# Patient Record
Sex: MALE | Race: WHITE | NOT HISPANIC OR LATINO | Employment: OTHER
[De-identification: names, ages, dates, MRNs, and addresses within clinical notes are randomized per-mention and may not be internally consistent; named-entity substitution may affect disease eponyms.]

---

## 2017-01-04 ENCOUNTER — RECORDS - HEALTHEAST (OUTPATIENT)
Dept: ADMINISTRATIVE | Facility: OTHER | Age: 77
End: 2017-01-04

## 2017-02-06 ENCOUNTER — AMBULATORY - HEALTHEAST (OUTPATIENT)
Dept: CARDIOLOGY | Facility: CLINIC | Age: 77
End: 2017-02-06

## 2017-02-06 ENCOUNTER — OFFICE VISIT - HEALTHEAST (OUTPATIENT)
Dept: CARDIOLOGY | Facility: CLINIC | Age: 77
End: 2017-02-06

## 2017-02-06 DIAGNOSIS — I35.0 AORTIC STENOSIS, MILD: ICD-10-CM

## 2017-02-06 DIAGNOSIS — R00.2 PALPITATIONS: ICD-10-CM

## 2017-02-06 ASSESSMENT — MIFFLIN-ST. JEOR: SCORE: 1589.43

## 2017-02-17 ENCOUNTER — HOSPITAL ENCOUNTER (OUTPATIENT)
Dept: CARDIOLOGY | Facility: HOSPITAL | Age: 77
Discharge: HOME | End: 2017-02-17
Attending: INTERNAL MEDICINE

## 2017-02-17 DIAGNOSIS — R00.2 PALPITATIONS: ICD-10-CM

## 2017-02-17 DIAGNOSIS — I35.0 AORTIC STENOSIS, MILD: ICD-10-CM

## 2017-02-17 ASSESSMENT — MIFFLIN-ST. JEOR
SCORE: 1589.43
SCORE: 1589.43

## 2017-02-20 LAB
AORTIC ROOT: 4.4 CM
AORTIC VALVE MEAN VELOCITY: 240 CM/S
AR DECEL SLOPE: 960 MM/S2
AR PEAK VELOCITY: 299 CM/S
ASCENDING AORTA: 3.9 CM
AV DIMENSIONLESS INDEX VTI: 0.3
AV MEAN GRADIENT: 26 MMHG
AV PEAK GRADIENT: 41.2 MMHG
AV REGURGITANT PEAK GRADIENT: 35.8 MMHG
AV REGURGITATION PRESSURE HALF TIME: 912 MS
AV VALVE AREA: 1.1 CM2
AV VELOCITY RATIO: 0.3
BSA FOR ECHO PROCEDURE: 2.08 M2
CV BLOOD PRESSURE: NORMAL MMHG
CV ECHO HEIGHT: 69 IN
CV ECHO WEIGHT: 196 LBS
DOP CALC AO PEAK VEL: 321 CM/S
DOP CALC AO VTI: 75.2 CM
DOP CALC LVOT AREA: 3.8 CM2
DOP CALC LVOT DIAMETER: 2.2 CM
DOP CALC LVOT PEAK VEL: 88 CM/S
DOP CALC LVOT STROKE VOLUME: 85.5 CM3
DOP CALCLVOT PEAK VEL VTI: 22.5 CM
EJECTION FRACTION: 56 % (ref 55–75)
FRACTIONAL SHORTENING: 28.9 % (ref 28–44)
INTERVENTRICULAR SEPTUM IN END DIASTOLE: 1.3 CM (ref 0.6–1)
IVS/PW RATIO: 1.2
LA AREA 1: 18.2 CM2
LA AREA 2: 19.7 CM2
LEFT ATRIUM LENGTH: 4.75 CM
LEFT ATRIUM SIZE: 4.3 CM
LEFT ATRIUM TO AORTIC ROOT RATIO: 0.98 NO UNITS
LEFT ATRIUM VOLUME INDEX: 30.8 ML/M2
LEFT ATRIUM VOLUME: 64.2 CM3
LEFT VENTRICLE CARDIAC INDEX: 3.6 L/MIN/M2
LEFT VENTRICLE CARDIAC OUTPUT: 7.4 L/MIN
LEFT VENTRICLE DIASTOLIC VOLUME INDEX: 78.4 CM3/M2 (ref 34–74)
LEFT VENTRICLE DIASTOLIC VOLUME: 163 CM3 (ref 62–150)
LEFT VENTRICLE HEART RATE: 87 BPM
LEFT VENTRICLE MASS INDEX: 95.2 G/M2
LEFT VENTRICLE SYSTOLIC VOLUME INDEX: 34.1 CM3/M2 (ref 11–31)
LEFT VENTRICLE SYSTOLIC VOLUME: 71 CM3 (ref 21–61)
LEFT VENTRICULAR INTERNAL DIMENSION IN DIASTOLE: 4.5 CM (ref 4.2–5.8)
LEFT VENTRICULAR INTERNAL DIMENSION IN SYSTOLE: 3.2 CM (ref 2.5–4)
LEFT VENTRICULAR MASS: 198.1 G
LEFT VENTRICULAR OUTFLOW TRACT MEAN GRADIENT: 1 MMHG
LEFT VENTRICULAR OUTFLOW TRACT MEAN VELOCITY: 56.1 CM/S
LEFT VENTRICULAR OUTFLOW TRACT PEAK GRADIENT: 3 MMHG
LEFT VENTRICULAR POSTERIOR WALL IN END DIASTOLE: 1.1 CM (ref 0.6–1)
LV STROKE VOLUME INDEX: 41.1 ML/M2
MITRAL VALVE E/A RATIO: 0.6
MV AVERAGE E/E' RATIO: 9.4 CM/S
MV DECELERATION TIME: 246 MS
MV E'TISSUE VEL-LAT: 6.53 CM/S
MV E'TISSUE VEL-MED: 6.19 CM/S
MV LATERAL E/E' RATIO: 9.1
MV MEDIAL E/E' RATIO: 9.6
MV PEAK A VELOCITY: 96.7 CM/S
MV PEAK E VELOCITY: 59.7 CM/S
NUC REST DIASTOLIC VOLUME INDEX: 3136 LBS
NUC REST SYSTOLIC VOLUME INDEX: 69 IN
RIGHT VENTRICULAR INTERNAL DIMENSION IN DYSTOLE: 3.5 CM
TRICUSPID REGURGITATION PEAK PRESSURE GRADIENT: 25.6 MMHG
TRICUSPID VALVE PEAK REGURGITANT VELOCITY: 253 CM/S

## 2017-02-23 ENCOUNTER — AMBULATORY - HEALTHEAST (OUTPATIENT)
Dept: CARDIOLOGY | Facility: CLINIC | Age: 77
End: 2017-02-23

## 2017-02-23 ENCOUNTER — RECORDS - HEALTHEAST (OUTPATIENT)
Dept: ADMINISTRATIVE | Facility: OTHER | Age: 77
End: 2017-02-23

## 2017-02-23 ENCOUNTER — COMMUNICATION - HEALTHEAST (OUTPATIENT)
Dept: CARDIOLOGY | Facility: CLINIC | Age: 77
End: 2017-02-23

## 2017-02-23 ENCOUNTER — COMMUNICATION - HEALTHEAST (OUTPATIENT)
Dept: PULMONOLOGY | Facility: OTHER | Age: 77
End: 2017-02-23

## 2017-03-03 ENCOUNTER — OFFICE VISIT - HEALTHEAST (OUTPATIENT)
Dept: PULMONOLOGY | Facility: OTHER | Age: 77
End: 2017-03-03

## 2017-03-03 DIAGNOSIS — R06.09 DYSPNEA ON EXERTION: ICD-10-CM

## 2017-03-03 ASSESSMENT — MIFFLIN-ST. JEOR: SCORE: 1605.76

## 2017-03-06 ENCOUNTER — AMBULATORY - HEALTHEAST (OUTPATIENT)
Dept: CARDIOLOGY | Facility: CLINIC | Age: 77
End: 2017-03-06

## 2017-03-06 DIAGNOSIS — R06.09 DOE (DYSPNEA ON EXERTION): ICD-10-CM

## 2017-03-14 ENCOUNTER — HOSPITAL ENCOUNTER (OUTPATIENT)
Dept: CARDIOLOGY | Facility: HOSPITAL | Age: 77
Discharge: HOME | End: 2017-03-14
Attending: INTERNAL MEDICINE

## 2017-03-14 ENCOUNTER — AMBULATORY - HEALTHEAST (OUTPATIENT)
Dept: LAB | Facility: HOSPITAL | Age: 77
End: 2017-03-14

## 2017-03-14 ENCOUNTER — HOSPITAL ENCOUNTER (OUTPATIENT)
Dept: NUCLEAR MEDICINE | Facility: HOSPITAL | Age: 77
Discharge: HOME | End: 2017-03-14
Attending: INTERNAL MEDICINE

## 2017-03-14 DIAGNOSIS — R06.09 OTHER FORMS OF DYSPNEA: ICD-10-CM

## 2017-03-14 DIAGNOSIS — E78.2 MIXED HYPERLIPIDEMIA: ICD-10-CM

## 2017-03-14 DIAGNOSIS — R06.09 DOE (DYSPNEA ON EXERTION): ICD-10-CM

## 2017-03-14 DIAGNOSIS — E11.9 DIABETES MELLITUS (H): ICD-10-CM

## 2017-03-14 LAB
CHOLEST SERPL-MCNC: 172 MG/DL
CV STRESS MAX HR HE: 107
FASTING STATUS PATIENT QL REPORTED: YES
HBA1C MFR BLD: 7.1 % (ref 4.2–6.1)
HDLC SERPL-MCNC: 38 MG/DL
LDLC SERPL CALC-MCNC: 108 MG/DL
NUC STRESS EJECTION FRACTION: 33 %
STRESS ECHO BASELINE BP: NORMAL M/S
STRESS ECHO BASELINE HR: 86 BPM
STRESS ECHO CALCULATED PERCENT HR: 74 %
STRESS ECHO LAST STRESS BP: NORMAL M/S
TRIGL SERPL-MCNC: 131 MG/DL

## 2017-03-17 DIAGNOSIS — Z86.73 HISTORY OF TIA (TRANSIENT ISCHEMIC ATTACK) AND STROKE: ICD-10-CM

## 2017-03-17 NOTE — TELEPHONE ENCOUNTER
simvastatin (ZOCOR) 20 MG tablet     Last Written Prescription Date: 2015  Last Fill Quantity: 90, # refills: 3  Last Office Visit with [facility] or [facility] prescribing provider: 2015       Lab Results   Component Value Date    CHOL 136 01/17/2012     Lab Results   Component Value Date    HDL 36 01/17/2012     Lab Results   Component Value Date    LDL 79 01/17/2012     Lab Results   Component Value Date    TRIG 102 01/17/2012     Lab Results   Component Value Date    CHOLHDLRATIO 3.7 01/17/2012     Please call patient, find out who is managing his medication and care.     Melody Ramirez, SWETAN, RN, PHN  Care Coordinator  [facility]

## 2017-03-21 ENCOUNTER — AMBULATORY - HEALTHEAST (OUTPATIENT)
Dept: CARDIOLOGY | Facility: CLINIC | Age: 77
End: 2017-03-21

## 2017-03-21 DIAGNOSIS — R06.09 DOE (DYSPNEA ON EXERTION): ICD-10-CM

## 2017-03-22 RX ORDER — SIMVASTATIN 20 MG
20 TABLET ORAL AT BEDTIME
Qty: 90 TABLET | Refills: 3 | OUTPATIENT
Start: 2017-03-22

## 2017-03-22 NOTE — TELEPHONE ENCOUNTER
Called and spoke to patient. He states he can send the refill to his primary physician Ronald Saucedo. Notified pharmacy.

## 2017-03-24 ENCOUNTER — AMBULATORY - HEALTHEAST (OUTPATIENT)
Dept: CARDIOLOGY | Facility: CLINIC | Age: 77
End: 2017-03-24

## 2017-03-24 DIAGNOSIS — R06.09 DOE (DYSPNEA ON EXERTION): ICD-10-CM

## 2017-03-28 ENCOUNTER — AMBULATORY - HEALTHEAST (OUTPATIENT)
Dept: CARDIOLOGY | Facility: CLINIC | Age: 77
End: 2017-03-28

## 2017-03-29 ENCOUNTER — AMBULATORY - HEALTHEAST (OUTPATIENT)
Dept: CARDIOLOGY | Facility: CLINIC | Age: 77
End: 2017-03-29

## 2017-03-29 ENCOUNTER — OFFICE VISIT - HEALTHEAST (OUTPATIENT)
Dept: CARDIOLOGY | Facility: CLINIC | Age: 77
End: 2017-03-29

## 2017-03-29 DIAGNOSIS — R06.09 DOE (DYSPNEA ON EXERTION): ICD-10-CM

## 2017-03-29 DIAGNOSIS — I49.1 PAC (PREMATURE ATRIAL CONTRACTION): ICD-10-CM

## 2017-03-29 DIAGNOSIS — I42.9 CARDIOMYOPATHY (H): ICD-10-CM

## 2017-03-29 DIAGNOSIS — I35.0 MODERATE AORTIC STENOSIS: ICD-10-CM

## 2017-03-29 DIAGNOSIS — R94.39 ABNORMAL NUCLEAR STRESS TEST: ICD-10-CM

## 2017-03-29 DIAGNOSIS — R53.83 FATIGUE: ICD-10-CM

## 2017-03-29 ASSESSMENT — MIFFLIN-ST. JEOR: SCORE: 1605.76

## 2017-03-30 ENCOUNTER — COMMUNICATION - HEALTHEAST (OUTPATIENT)
Dept: CARDIOLOGY | Facility: CLINIC | Age: 77
End: 2017-03-30

## 2017-03-30 ENCOUNTER — HOSPITAL ENCOUNTER (OUTPATIENT)
Dept: CARDIOLOGY | Facility: HOSPITAL | Age: 77
Discharge: HOME | End: 2017-03-30
Attending: INTERNAL MEDICINE

## 2017-03-30 DIAGNOSIS — I42.9 CARDIOMYOPATHY (H): ICD-10-CM

## 2017-03-31 LAB
ALBUMIN PERCENT: 62.9 % (ref 51–67)
ALBUMIN SERPL ELPH-MCNC: 3.9 G/DL (ref 3.2–4.7)
ALPHA 1 PERCENT: 2.6 % (ref 2–4)
ALPHA 2 PERCENT: 11.9 % (ref 5–13)
ALPHA1 GLOB SERPL ELPH-MCNC: 0.2 G/DL (ref 0.1–0.3)
ALPHA2 GLOB SERPL ELPH-MCNC: 0.7 G/DL (ref 0.4–0.9)
B-GLOBULIN SERPL ELPH-MCNC: 0.7 G/DL (ref 0.7–1.2)
BETA PERCENT: 11.6 % (ref 10–17)
GAMMA GLOB SERPL ELPH-MCNC: 0.7 G/DL (ref 0.6–1.4)
GAMMA GLOBULIN PERCENT: 11 % (ref 9–20)
PATH ICD:: NORMAL
PROT PATTERN SERPL ELPH-IMP: NORMAL
PROT SERPL-MCNC: 6.2 G/DL (ref 6–8)
REVIEWING PATHOLOGIST: NORMAL

## 2017-04-03 ENCOUNTER — COMMUNICATION - HEALTHEAST (OUTPATIENT)
Dept: CARDIOLOGY | Facility: CLINIC | Age: 77
End: 2017-04-03

## 2017-04-04 ENCOUNTER — COMMUNICATION - HEALTHEAST (OUTPATIENT)
Dept: CARDIOLOGY | Facility: CLINIC | Age: 77
End: 2017-04-04

## 2017-04-06 ENCOUNTER — HOSPITAL ENCOUNTER (OUTPATIENT)
Dept: CT IMAGING | Facility: CLINIC | Age: 77
Discharge: HOME | End: 2017-04-06
Attending: INTERNAL MEDICINE

## 2017-04-06 ENCOUNTER — COMMUNICATION - HEALTHEAST (OUTPATIENT)
Dept: CARDIOLOGY | Facility: CLINIC | Age: 77
End: 2017-04-06

## 2017-04-06 DIAGNOSIS — R06.09 DOE (DYSPNEA ON EXERTION): ICD-10-CM

## 2017-04-06 DIAGNOSIS — R06.09 OTHER FORMS OF DYSPNEA: ICD-10-CM

## 2017-04-06 DIAGNOSIS — I42.9 CARDIOMYOPATHY (H): ICD-10-CM

## 2017-04-06 DIAGNOSIS — R94.39 ABNORMAL NUCLEAR STRESS TEST: ICD-10-CM

## 2017-04-06 LAB
CV CALCIUM SCORE AGATSTON LM: 0
CV CALCIUM SCORING AGATSON LAD: 79
CV CALCIUM SCORING AGATSTON CX: 152
CV CALCIUM SCORING AGATSTON RCA: 29
CV CALCIUM SCORING AGATSTON TOTAL: 260

## 2017-04-06 ASSESSMENT — MIFFLIN-ST. JEOR: SCORE: 1557.68

## 2017-04-14 ENCOUNTER — AMBULATORY - HEALTHEAST (OUTPATIENT)
Dept: CARDIOLOGY | Facility: CLINIC | Age: 77
End: 2017-04-14

## 2017-04-17 ENCOUNTER — OFFICE VISIT - HEALTHEAST (OUTPATIENT)
Dept: CARDIOLOGY | Facility: CLINIC | Age: 77
End: 2017-04-17

## 2017-04-17 DIAGNOSIS — I35.0 MODERATE AORTIC STENOSIS: ICD-10-CM

## 2017-04-17 DIAGNOSIS — I10 ESSENTIAL HYPERTENSION: ICD-10-CM

## 2017-04-17 DIAGNOSIS — I49.1 ATRIAL PREMATURE BEATS: ICD-10-CM

## 2017-04-17 ASSESSMENT — MIFFLIN-ST. JEOR: SCORE: 1580.36

## 2017-06-28 ENCOUNTER — RECORDS - HEALTHEAST (OUTPATIENT)
Dept: ADMINISTRATIVE | Facility: OTHER | Age: 77
End: 2017-06-28

## 2017-09-01 ENCOUNTER — OFFICE VISIT - HEALTHEAST (OUTPATIENT)
Dept: CARDIOLOGY | Facility: CLINIC | Age: 77
End: 2017-09-01

## 2017-09-01 DIAGNOSIS — E78.5 DYSLIPIDEMIA, GOAL LDL BELOW 100: ICD-10-CM

## 2017-09-01 DIAGNOSIS — I35.0 MODERATE AORTIC STENOSIS: ICD-10-CM

## 2017-09-01 ASSESSMENT — MIFFLIN-ST. JEOR: SCORE: 1580.36

## 2017-09-29 ENCOUNTER — COMMUNICATION - HEALTHEAST (OUTPATIENT)
Dept: CARDIOLOGY | Facility: CLINIC | Age: 77
End: 2017-09-29

## 2018-01-11 ENCOUNTER — RECORDS - HEALTHEAST (OUTPATIENT)
Dept: ADMINISTRATIVE | Facility: OTHER | Age: 78
End: 2018-01-11

## 2018-01-18 ENCOUNTER — HOSPITAL ENCOUNTER (OUTPATIENT)
Dept: RADIOLOGY | Facility: HOSPITAL | Age: 78
Discharge: HOME | End: 2018-01-18
Attending: OTOLARYNGOLOGY

## 2018-01-18 DIAGNOSIS — R05.9 COUGH: ICD-10-CM

## 2018-02-01 ENCOUNTER — COMMUNICATION - HEALTHEAST (OUTPATIENT)
Dept: ADMINISTRATIVE | Facility: CLINIC | Age: 78
End: 2018-02-01

## 2018-02-05 ENCOUNTER — AMBULATORY - HEALTHEAST (OUTPATIENT)
Dept: CARDIOLOGY | Facility: CLINIC | Age: 78
End: 2018-02-05

## 2018-02-05 DIAGNOSIS — I35.0 AORTIC STENOSIS: ICD-10-CM

## 2018-02-05 DIAGNOSIS — R06.02 SHORTNESS OF BREATH: ICD-10-CM

## 2018-02-14 ENCOUNTER — AMBULATORY - HEALTHEAST (OUTPATIENT)
Dept: LAB | Facility: HOSPITAL | Age: 78
End: 2018-02-14

## 2018-02-14 ENCOUNTER — HOSPITAL ENCOUNTER (OUTPATIENT)
Dept: CARDIOLOGY | Facility: HOSPITAL | Age: 78
Discharge: HOME | End: 2018-02-14
Attending: INTERNAL MEDICINE

## 2018-02-14 DIAGNOSIS — R06.02 SHORTNESS OF BREATH: ICD-10-CM

## 2018-02-14 DIAGNOSIS — I35.0 AORTIC STENOSIS: ICD-10-CM

## 2018-02-14 DIAGNOSIS — I35.0 MODERATE AORTIC STENOSIS: ICD-10-CM

## 2018-02-14 LAB
AORTIC ROOT: 4.2 CM
AORTIC VALVE MEAN VELOCITY: 250 CM/S
AR DECEL SLOPE: 2710 MM/S2
AR PEAK VELOCITY: 423 CM/S
ASCENDING AORTA: 3.7 CM
AV CUSP SEPERATION: 0.8 CM
AV CUSP SEPERATION: 1.1 CM
AV CUSP SEPERATION: 1.4 CM
AV DIMENSIONLESS INDEX VTI: 0.2
AV MEAN GRADIENT: 26 MMHG
AV PEAK GRADIENT: 37.5 MMHG
AV REGURGITANT PEAK GRADIENT: 71.6 MMHG
AV REGURGITATION PRESSURE HALF TIME: 459 MS
AV VALVE AREA: 1.2 CM2
AV VELOCITY RATIO: 0.3
BNP SERPL-MCNC: 105 PG/ML (ref 0–80)
BSA FOR ECHO PROCEDURE: 2.07 M2
CV ECHO HEIGHT: 69 IN
CV ECHO WEIGHT: 194 LBS
DOP CALC AO PEAK VEL: 306 CM/S
DOP CALC AO VTI: 71.7 CM
DOP CALC LVOT AREA: 4.91 CM2
DOP CALC LVOT DIAMETER: 2.5 CM
DOP CALC LVOT PEAK VEL: 82.8 CM/S
DOP CALC LVOT STROKE VOLUME: 85.9 CM3
DOP CALC MV VTI: 30.8 CM
DOP CALCLVOT PEAK VEL VTI: 17.5 CM
ECHO EJECTION FRACTION ESTIMATED: 40 %
EJECTION FRACTION: 60 % (ref 55–75)
FRACTIONAL SHORTENING: 25.6 % (ref 28–44)
INTERVENTRICULAR SEPTUM IN END DIASTOLE: 1.21 CM (ref 0.6–1)
IVS/PW RATIO: 1.1
LA AREA 1: 26 CM2
LA AREA 2: 27.8 CM2
LEFT ATRIUM LENGTH: 6.1 CM
LEFT ATRIUM SIZE: 4.3 CM
LEFT ATRIUM TO AORTIC ROOT RATIO: 0.91 NO UNITS
LEFT ATRIUM VOLUME INDEX: 48.7 ML/M2
LEFT ATRIUM VOLUME: 100.7 ML
LEFT VENTRICLE CARDIAC INDEX: 3.3 L/MIN/M2
LEFT VENTRICLE CARDIAC OUTPUT: 6.9 L/MIN
LEFT VENTRICLE DIASTOLIC VOLUME INDEX: 49.8 CM3/M2 (ref 34–74)
LEFT VENTRICLE DIASTOLIC VOLUME: 103 CM3 (ref 62–150)
LEFT VENTRICLE HEART RATE: 80 BPM
LEFT VENTRICLE MASS INDEX: 96.8 G/M2
LEFT VENTRICLE SYSTOLIC VOLUME INDEX: 20 CM3/M2 (ref 11–31)
LEFT VENTRICLE SYSTOLIC VOLUME: 41.4 CM3 (ref 21–61)
LEFT VENTRICULAR INTERNAL DIMENSION IN DIASTOLE: 4.64 CM (ref 4.2–5.8)
LEFT VENTRICULAR INTERNAL DIMENSION IN SYSTOLE: 3.45 CM (ref 2.5–4)
LEFT VENTRICULAR MASS: 200.4 G
LEFT VENTRICULAR OUTFLOW TRACT MEAN GRADIENT: 2 MMHG
LEFT VENTRICULAR OUTFLOW TRACT MEAN VELOCITY: 64 CM/S
LEFT VENTRICULAR OUTFLOW TRACT PEAK GRADIENT: 3 MMHG
LEFT VENTRICULAR POSTERIOR WALL IN END DIASTOLE: 1.13 CM (ref 0.6–1)
LV STROKE VOLUME INDEX: 41.5 ML/M2
MITRAL VALVE DECELERATION SLOPE: 4400 MM/S2
MITRAL VALVE E/A RATIO: 0.9
MITRAL VALVE MEAN INFLOW VELOCITY: 61.3 CM/S
MITRAL VALVE PEAK VELOCITY: 103 CM/S
MITRAL VALVE PRESSURE HALF-TIME: 60 MS
MV AREA VTI: 2.79 CM2
MV DECELERATION TIME: 148 MS
MV MEAN GRADIENT: 2 MMHG
MV PEAK A VELOCITY: 92.6 CM/S
MV PEAK E VELOCITY: 82 CM/S
MV PEAK GRADIENT: 4.2 MMHG
MV VALVE AREA BY CONTINUITY EQUATION: 2.8 CM2
MV VALVE AREA PRESSURE 1/2 METHOD: 3.7 CM2
NUC REST DIASTOLIC VOLUME INDEX: 3104 LBS
NUC REST SYSTOLIC VOLUME INDEX: 69 IN
TRICUSPID REGURGITATION PEAK PRESSURE GRADIENT: 18.7 MMHG
TRICUSPID VALVE PEAK REGURGITANT VELOCITY: 216 CM/S

## 2018-02-14 ASSESSMENT — MIFFLIN-ST. JEOR: SCORE: 1580.36

## 2018-02-15 ENCOUNTER — RECORDS - HEALTHEAST (OUTPATIENT)
Dept: ADMINISTRATIVE | Facility: OTHER | Age: 78
End: 2018-02-15

## 2018-02-20 ENCOUNTER — RECORDS - HEALTHEAST (OUTPATIENT)
Dept: ADMINISTRATIVE | Facility: OTHER | Age: 78
End: 2018-02-20

## 2018-02-20 ENCOUNTER — OFFICE VISIT - HEALTHEAST (OUTPATIENT)
Dept: CARDIOLOGY | Facility: CLINIC | Age: 78
End: 2018-02-20

## 2018-02-20 DIAGNOSIS — I10 ESSENTIAL HYPERTENSION: ICD-10-CM

## 2018-02-20 DIAGNOSIS — I35.0 MODERATE AORTIC STENOSIS: ICD-10-CM

## 2018-02-20 DIAGNOSIS — I42.9 CARDIOMYOPATHY, UNSPECIFIED TYPE (H): ICD-10-CM

## 2018-02-20 DIAGNOSIS — E78.5 DYSLIPIDEMIA, GOAL LDL BELOW 100: ICD-10-CM

## 2018-02-20 ASSESSMENT — MIFFLIN-ST. JEOR: SCORE: 1603.04

## 2018-02-27 ENCOUNTER — COMMUNICATION - HEALTHEAST (OUTPATIENT)
Dept: CARDIOLOGY | Facility: CLINIC | Age: 78
End: 2018-02-27

## 2018-03-01 ENCOUNTER — RECORDS - HEALTHEAST (OUTPATIENT)
Dept: ADMINISTRATIVE | Facility: OTHER | Age: 78
End: 2018-03-01

## 2018-03-08 ENCOUNTER — OFFICE VISIT - HEALTHEAST (OUTPATIENT)
Dept: CARDIOLOGY | Facility: CLINIC | Age: 78
End: 2018-03-08

## 2018-03-08 ENCOUNTER — AMBULATORY - HEALTHEAST (OUTPATIENT)
Dept: CARDIOLOGY | Facility: CLINIC | Age: 78
End: 2018-03-08

## 2018-03-08 ENCOUNTER — AMBULATORY - HEALTHEAST (OUTPATIENT)
Dept: SLEEP MEDICINE | Facility: CLINIC | Age: 78
End: 2018-03-08

## 2018-03-08 DIAGNOSIS — I50.20 HEART FAILURE WITH REDUCED EJECTION FRACTION (H): ICD-10-CM

## 2018-03-08 DIAGNOSIS — I42.9 CARDIOMYOPATHY, UNSPECIFIED TYPE (H): ICD-10-CM

## 2018-03-08 DIAGNOSIS — I10 ESSENTIAL HYPERTENSION: ICD-10-CM

## 2018-03-08 LAB
ANION GAP SERPL CALCULATED.3IONS-SCNC: 10 MMOL/L (ref 5–18)
BUN SERPL-MCNC: 10 MG/DL (ref 8–28)
CALCIUM SERPL-MCNC: 9.3 MG/DL (ref 8.5–10.5)
CHLORIDE BLD-SCNC: 102 MMOL/L (ref 98–107)
CO2 SERPL-SCNC: 24 MMOL/L (ref 22–31)
CREAT SERPL-MCNC: 0.85 MG/DL (ref 0.7–1.3)
GFR SERPL CREATININE-BSD FRML MDRD: >60 ML/MIN/1.73M2
GLUCOSE BLD-MCNC: 317 MG/DL (ref 70–125)
POTASSIUM BLD-SCNC: 4.2 MMOL/L (ref 3.5–5)
SODIUM SERPL-SCNC: 136 MMOL/L (ref 136–145)

## 2018-03-08 ASSESSMENT — MIFFLIN-ST. JEOR: SCORE: 1563.35

## 2018-03-19 ENCOUNTER — AMBULATORY - HEALTHEAST (OUTPATIENT)
Dept: LAB | Facility: HOSPITAL | Age: 78
End: 2018-03-19

## 2018-03-19 ENCOUNTER — RECORDS - HEALTHEAST (OUTPATIENT)
Dept: ADMINISTRATIVE | Facility: OTHER | Age: 78
End: 2018-03-19

## 2018-03-19 DIAGNOSIS — E78.2 MIXED HYPERLIPIDEMIA: ICD-10-CM

## 2018-03-19 DIAGNOSIS — E11.9 DIABETES MELLITUS (H): ICD-10-CM

## 2018-03-19 LAB
ALBUMIN SERPL-MCNC: 3.1 G/DL (ref 3.5–5)
ALP SERPL-CCNC: 71 U/L (ref 45–120)
ALT SERPL W P-5'-P-CCNC: 19 U/L (ref 0–45)
ANION GAP SERPL CALCULATED.3IONS-SCNC: 9 MMOL/L (ref 5–18)
AST SERPL W P-5'-P-CCNC: 18 U/L (ref 0–40)
BILIRUB SERPL-MCNC: 0.4 MG/DL (ref 0–1)
BUN SERPL-MCNC: 6 MG/DL (ref 8–28)
CALCIUM SERPL-MCNC: 9.3 MG/DL (ref 8.5–10.5)
CHLORIDE BLD-SCNC: 103 MMOL/L (ref 98–107)
CHOLEST SERPL-MCNC: 158 MG/DL
CO2 SERPL-SCNC: 28 MMOL/L (ref 22–31)
CREAT SERPL-MCNC: 0.79 MG/DL (ref 0.7–1.3)
CREAT UR-MCNC: 16.8 MG/DL
FASTING STATUS PATIENT QL REPORTED: YES
GFR SERPL CREATININE-BSD FRML MDRD: >60 ML/MIN/1.73M2
GLUCOSE BLD-MCNC: 148 MG/DL (ref 70–125)
HBA1C MFR BLD: 8.6 % (ref 4.2–6.1)
HDLC SERPL-MCNC: 48 MG/DL
LDLC SERPL CALC-MCNC: 92 MG/DL
MICROALBUMIN UR-MCNC: <0.5 MG/DL (ref 0–1.99)
MICROALBUMIN/CREAT UR: NORMAL MG/G
POTASSIUM BLD-SCNC: 4.3 MMOL/L (ref 3.5–5)
PROT SERPL-MCNC: 6.9 G/DL (ref 6–8)
SODIUM SERPL-SCNC: 140 MMOL/L (ref 136–145)
TRIGL SERPL-MCNC: 90 MG/DL

## 2018-03-22 ENCOUNTER — AMBULATORY - HEALTHEAST (OUTPATIENT)
Dept: CARDIOLOGY | Facility: CLINIC | Age: 78
End: 2018-03-22

## 2018-03-22 ENCOUNTER — OFFICE VISIT - HEALTHEAST (OUTPATIENT)
Dept: CARDIOLOGY | Facility: CLINIC | Age: 78
End: 2018-03-22

## 2018-03-22 DIAGNOSIS — I10 ESSENTIAL HYPERTENSION: ICD-10-CM

## 2018-03-22 DIAGNOSIS — I50.20 HEART FAILURE WITH REDUCED EJECTION FRACTION (H): ICD-10-CM

## 2018-03-22 ASSESSMENT — MIFFLIN-ST. JEOR: SCORE: 1554.27

## 2018-04-02 ENCOUNTER — RECORDS - HEALTHEAST (OUTPATIENT)
Dept: SLEEP MEDICINE | Facility: CLINIC | Age: 78
End: 2018-04-02

## 2018-04-02 ENCOUNTER — RECORDS - HEALTHEAST (OUTPATIENT)
Dept: ADMINISTRATIVE | Facility: OTHER | Age: 78
End: 2018-04-02

## 2018-04-02 DIAGNOSIS — I50.20 UNSPECIFIED SYSTOLIC (CONGESTIVE) HEART FAILURE (H): ICD-10-CM

## 2018-04-09 ENCOUNTER — COMMUNICATION - HEALTHEAST (OUTPATIENT)
Dept: SLEEP MEDICINE | Facility: CLINIC | Age: 78
End: 2018-04-09

## 2018-04-11 ENCOUNTER — AMBULATORY - HEALTHEAST (OUTPATIENT)
Dept: CARDIOLOGY | Facility: CLINIC | Age: 78
End: 2018-04-11

## 2018-04-12 ENCOUNTER — OFFICE VISIT - HEALTHEAST (OUTPATIENT)
Dept: CARDIOLOGY | Facility: CLINIC | Age: 78
End: 2018-04-12

## 2018-04-12 DIAGNOSIS — I42.9 CARDIOMYOPATHY, UNSPECIFIED TYPE (H): ICD-10-CM

## 2018-04-12 DIAGNOSIS — I50.20 HEART FAILURE WITH REDUCED EJECTION FRACTION (H): ICD-10-CM

## 2018-04-12 DIAGNOSIS — I10 ESSENTIAL HYPERTENSION: ICD-10-CM

## 2018-04-12 ASSESSMENT — MIFFLIN-ST. JEOR: SCORE: 1567.88

## 2018-04-24 ENCOUNTER — OFFICE VISIT - HEALTHEAST (OUTPATIENT)
Dept: SLEEP MEDICINE | Facility: CLINIC | Age: 78
End: 2018-04-24

## 2018-04-24 DIAGNOSIS — G47.8 UPPER AIRWAY RESISTANCE SYNDROME: ICD-10-CM

## 2018-04-24 DIAGNOSIS — G47.8 SLEEP DYSFUNCTION WITH SLEEP STAGE DISTURBANCE: ICD-10-CM

## 2018-04-24 DIAGNOSIS — G47.69 SLEEP-RELATED MOVEMENT DISORDER: ICD-10-CM

## 2018-04-24 ASSESSMENT — MIFFLIN-ST. JEOR: SCORE: 1561.53

## 2018-05-02 ENCOUNTER — OFFICE VISIT - HEALTHEAST (OUTPATIENT)
Dept: CARDIOLOGY | Facility: CLINIC | Age: 78
End: 2018-05-02

## 2018-05-02 DIAGNOSIS — I42.9 CARDIOMYOPATHY, UNSPECIFIED TYPE (H): ICD-10-CM

## 2018-05-02 DIAGNOSIS — I10 ESSENTIAL HYPERTENSION: ICD-10-CM

## 2018-05-02 DIAGNOSIS — R00.1 BRADYCARDIA: ICD-10-CM

## 2018-05-02 DIAGNOSIS — I50.20 HEART FAILURE WITH REDUCED EJECTION FRACTION (H): ICD-10-CM

## 2018-05-02 LAB
ATRIAL RATE - MUSE: 119 BPM
DIASTOLIC BLOOD PRESSURE - MUSE: NORMAL MMHG
INTERPRETATION ECG - MUSE: NORMAL
P AXIS - MUSE: NORMAL DEGREES
PR INTERVAL - MUSE: NORMAL MS
QRS DURATION - MUSE: 90 MS
QT - MUSE: 370 MS
QTC - MUSE: 429 MS
R AXIS - MUSE: -2 DEGREES
SYSTOLIC BLOOD PRESSURE - MUSE: NORMAL MMHG
T AXIS - MUSE: 11 DEGREES
VENTRICULAR RATE- MUSE: 81 BPM

## 2018-05-02 ASSESSMENT — MIFFLIN-ST. JEOR: SCORE: 1563.35

## 2018-05-14 ENCOUNTER — RECORDS - HEALTHEAST (OUTPATIENT)
Dept: LAB | Facility: CLINIC | Age: 78
End: 2018-05-14

## 2018-05-14 LAB
ANION GAP SERPL CALCULATED.3IONS-SCNC: 12 MMOL/L (ref 5–18)
BUN SERPL-MCNC: 9 MG/DL (ref 8–28)
CALCIUM SERPL-MCNC: 9.1 MG/DL (ref 8.5–10.5)
CHLORIDE BLD-SCNC: 104 MMOL/L (ref 98–107)
CO2 SERPL-SCNC: 23 MMOL/L (ref 22–31)
CREAT SERPL-MCNC: 0.75 MG/DL (ref 0.7–1.3)
GFR SERPL CREATININE-BSD FRML MDRD: >60 ML/MIN/1.73M2
GLUCOSE BLD-MCNC: 190 MG/DL (ref 70–125)
POTASSIUM BLD-SCNC: 4.2 MMOL/L (ref 3.5–5)
SODIUM SERPL-SCNC: 139 MMOL/L (ref 136–145)

## 2018-05-14 ASSESSMENT — MIFFLIN-ST. JEOR: SCORE: 1536.13

## 2018-05-15 ENCOUNTER — AMBULATORY - HEALTHEAST (OUTPATIENT)
Dept: CARDIOLOGY | Facility: CLINIC | Age: 78
End: 2018-05-15

## 2018-05-15 ENCOUNTER — COMMUNICATION - HEALTHEAST (OUTPATIENT)
Dept: CARDIOLOGY | Facility: CLINIC | Age: 78
End: 2018-05-15

## 2018-05-17 ENCOUNTER — SURGERY - HEALTHEAST (OUTPATIENT)
Dept: SURGERY | Facility: CLINIC | Age: 78
End: 2018-05-17

## 2018-05-17 ENCOUNTER — ANESTHESIA - HEALTHEAST (OUTPATIENT)
Dept: SURGERY | Facility: CLINIC | Age: 78
End: 2018-05-17

## 2018-05-17 ASSESSMENT — MIFFLIN-ST. JEOR: SCORE: 1524.79

## 2018-05-21 ENCOUNTER — OFFICE VISIT - HEALTHEAST (OUTPATIENT)
Dept: CARDIOLOGY | Facility: CLINIC | Age: 78
End: 2018-05-21

## 2018-05-21 DIAGNOSIS — I35.0 MODERATE AORTIC STENOSIS: ICD-10-CM

## 2018-05-21 DIAGNOSIS — I50.22 CHRONIC SYSTOLIC HEART FAILURE (H): ICD-10-CM

## 2018-05-21 DIAGNOSIS — I42.9 CARDIOMYOPATHY, UNSPECIFIED TYPE (H): ICD-10-CM

## 2018-05-21 ASSESSMENT — MIFFLIN-ST. JEOR: SCORE: 1554.27

## 2018-06-11 ENCOUNTER — COMMUNICATION - HEALTHEAST (OUTPATIENT)
Dept: CARDIOLOGY | Facility: CLINIC | Age: 78
End: 2018-06-11

## 2018-06-13 ENCOUNTER — OFFICE VISIT - HEALTHEAST (OUTPATIENT)
Dept: CARDIOLOGY | Facility: CLINIC | Age: 78
End: 2018-06-13

## 2018-06-13 DIAGNOSIS — I10 ESSENTIAL HYPERTENSION: ICD-10-CM

## 2018-06-13 DIAGNOSIS — I50.20 HEART FAILURE WITH REDUCED EJECTION FRACTION (H): ICD-10-CM

## 2018-06-13 DIAGNOSIS — I50.22 CHRONIC SYSTOLIC HEART FAILURE (H): ICD-10-CM

## 2018-06-13 DIAGNOSIS — I49.3 PVC'S (PREMATURE VENTRICULAR CONTRACTIONS): ICD-10-CM

## 2018-06-13 ASSESSMENT — MIFFLIN-ST. JEOR: SCORE: 1575.82

## 2018-06-22 ENCOUNTER — COMMUNICATION - HEALTHEAST (OUTPATIENT)
Dept: CARDIOLOGY | Facility: CLINIC | Age: 78
End: 2018-06-22

## 2018-06-25 ENCOUNTER — COMMUNICATION - HEALTHEAST (OUTPATIENT)
Dept: CARDIOLOGY | Facility: CLINIC | Age: 78
End: 2018-06-25

## 2018-07-12 ENCOUNTER — OFFICE VISIT - HEALTHEAST (OUTPATIENT)
Dept: CARDIOLOGY | Facility: CLINIC | Age: 78
End: 2018-07-12

## 2018-07-12 DIAGNOSIS — I50.20 HEART FAILURE WITH REDUCED EJECTION FRACTION (H): ICD-10-CM

## 2018-07-12 DIAGNOSIS — I42.9 CARDIOMYOPATHY, UNSPECIFIED TYPE (H): ICD-10-CM

## 2018-07-12 DIAGNOSIS — I50.22 CHRONIC SYSTOLIC HEART FAILURE (H): ICD-10-CM

## 2018-07-12 DIAGNOSIS — I10 ESSENTIAL HYPERTENSION: ICD-10-CM

## 2018-07-12 ASSESSMENT — MIFFLIN-ST. JEOR: SCORE: 1562.21

## 2018-08-07 ENCOUNTER — RECORDS - HEALTHEAST (OUTPATIENT)
Dept: ADMINISTRATIVE | Facility: OTHER | Age: 78
End: 2018-08-07

## 2018-09-06 ENCOUNTER — AMBULATORY - HEALTHEAST (OUTPATIENT)
Dept: LAB | Facility: HOSPITAL | Age: 78
End: 2018-09-06

## 2018-09-06 DIAGNOSIS — E11.9 DIABETES MELLITUS (H): ICD-10-CM

## 2018-09-06 DIAGNOSIS — E78.2 MIXED HYPERLIPIDEMIA: ICD-10-CM

## 2018-09-06 LAB
ALBUMIN SERPL-MCNC: 3.3 G/DL (ref 3.5–5)
ANION GAP SERPL CALCULATED.3IONS-SCNC: 7 MMOL/L (ref 5–18)
BUN SERPL-MCNC: 7 MG/DL (ref 8–28)
CALCIUM SERPL-MCNC: 9.6 MG/DL (ref 8.5–10.5)
CHLORIDE BLD-SCNC: 106 MMOL/L (ref 98–107)
CHOLEST SERPL-MCNC: 167 MG/DL
CO2 SERPL-SCNC: 29 MMOL/L (ref 22–31)
CREAT SERPL-MCNC: 0.83 MG/DL (ref 0.7–1.3)
FASTING STATUS PATIENT QL REPORTED: YES
GFR SERPL CREATININE-BSD FRML MDRD: >60 ML/MIN/1.73M2
GLUCOSE BLD-MCNC: 171 MG/DL (ref 70–125)
HBA1C MFR BLD: 8.6 % (ref 4.2–6.1)
HDLC SERPL-MCNC: 42 MG/DL
LDLC SERPL CALC-MCNC: 100 MG/DL
PHOSPHATE SERPL-MCNC: 3.3 MG/DL (ref 2.5–4.5)
POTASSIUM BLD-SCNC: 4.2 MMOL/L (ref 3.5–5)
SODIUM SERPL-SCNC: 142 MMOL/L (ref 136–145)
TRIGL SERPL-MCNC: 127 MG/DL

## 2018-10-10 ENCOUNTER — RECORDS - HEALTHEAST (OUTPATIENT)
Dept: LAB | Facility: CLINIC | Age: 78
End: 2018-10-10

## 2018-10-10 LAB — INR PPP: 3.58 (ref 0.9–1.1)

## 2018-10-24 ENCOUNTER — RECORDS - HEALTHEAST (OUTPATIENT)
Dept: LAB | Facility: CLINIC | Age: 78
End: 2018-10-24

## 2018-10-26 LAB — BACTERIA SPEC CULT: NORMAL

## 2019-02-12 ENCOUNTER — RECORDS - HEALTHEAST (OUTPATIENT)
Dept: LAB | Facility: CLINIC | Age: 79
End: 2019-02-12

## 2019-02-12 LAB
ANION GAP SERPL CALCULATED.3IONS-SCNC: 10 MMOL/L (ref 5–18)
BUN SERPL-MCNC: 8 MG/DL (ref 8–28)
CALCIUM SERPL-MCNC: 8.3 MG/DL (ref 8.5–10.5)
CHLORIDE BLD-SCNC: 107 MMOL/L (ref 98–107)
CO2 SERPL-SCNC: 22 MMOL/L (ref 22–31)
CREAT SERPL-MCNC: 0.83 MG/DL (ref 0.7–1.3)
GFR SERPL CREATININE-BSD FRML MDRD: >60 ML/MIN/1.73M2
GLUCOSE BLD-MCNC: 103 MG/DL (ref 70–125)
POTASSIUM BLD-SCNC: 4.6 MMOL/L (ref 3.5–5)
SODIUM SERPL-SCNC: 139 MMOL/L (ref 136–145)

## 2019-06-06 ENCOUNTER — COMMUNICATION - HEALTHEAST (OUTPATIENT)
Dept: CARDIOLOGY | Facility: CLINIC | Age: 79
End: 2019-06-06

## 2019-06-06 DIAGNOSIS — I42.9 CARDIOMYOPATHY, UNSPECIFIED TYPE (H): ICD-10-CM

## 2019-06-19 ENCOUNTER — RECORDS - HEALTHEAST (OUTPATIENT)
Dept: LAB | Facility: CLINIC | Age: 79
End: 2019-06-19

## 2019-06-19 LAB
ANION GAP SERPL CALCULATED.3IONS-SCNC: 6 MMOL/L (ref 5–18)
BUN SERPL-MCNC: 7 MG/DL (ref 8–28)
CALCIUM SERPL-MCNC: 9.7 MG/DL (ref 8.5–10.5)
CHLORIDE BLD-SCNC: 101 MMOL/L (ref 98–107)
CHOLEST SERPL-MCNC: 158 MG/DL
CO2 SERPL-SCNC: 30 MMOL/L (ref 22–31)
CREAT SERPL-MCNC: 0.83 MG/DL (ref 0.7–1.3)
FASTING STATUS PATIENT QL REPORTED: ABNORMAL
GFR SERPL CREATININE-BSD FRML MDRD: >60 ML/MIN/1.73M2
GLUCOSE BLD-MCNC: 200 MG/DL (ref 70–125)
HDLC SERPL-MCNC: 45 MG/DL
LDLC SERPL CALC-MCNC: 81 MG/DL
POTASSIUM BLD-SCNC: 4.5 MMOL/L (ref 3.5–5)
SODIUM SERPL-SCNC: 137 MMOL/L (ref 136–145)
TRIGL SERPL-MCNC: 159 MG/DL

## 2019-07-24 ENCOUNTER — COMMUNICATION - HEALTHEAST (OUTPATIENT)
Dept: CARDIOLOGY | Facility: CLINIC | Age: 79
End: 2019-07-24

## 2019-07-24 DIAGNOSIS — I42.9 CARDIOMYOPATHY, UNSPECIFIED TYPE (H): ICD-10-CM

## 2019-08-14 ENCOUNTER — AMBULATORY - HEALTHEAST (OUTPATIENT)
Dept: BEHAVIORAL HEALTH | Facility: CLINIC | Age: 79
End: 2019-08-14

## 2019-08-14 DIAGNOSIS — F19.10 CHEMICAL ABUSE (H): ICD-10-CM

## 2019-08-16 ENCOUNTER — OFFICE VISIT - HEALTHEAST (OUTPATIENT)
Dept: ADDICTION MEDICINE | Facility: CLINIC | Age: 79
End: 2019-08-16

## 2019-08-16 DIAGNOSIS — F10.21 ALCOHOL USE DISORDER, SEVERE, IN SUSTAINED REMISSION (H): ICD-10-CM

## 2019-08-22 ENCOUNTER — COMMUNICATION - HEALTHEAST (OUTPATIENT)
Dept: ADDICTION MEDICINE | Facility: CLINIC | Age: 79
End: 2019-08-22

## 2020-06-15 ENCOUNTER — AMBULATORY - HEALTHEAST (OUTPATIENT)
Dept: SURGERY | Facility: HOSPITAL | Age: 80
End: 2020-06-15

## 2020-06-15 DIAGNOSIS — Z11.59 ENCOUNTER FOR SCREENING FOR OTHER VIRAL DISEASES: ICD-10-CM

## 2020-07-26 ENCOUNTER — AMBULATORY - HEALTHEAST (OUTPATIENT)
Dept: FAMILY MEDICINE | Facility: CLINIC | Age: 80
End: 2020-07-26

## 2020-07-26 DIAGNOSIS — Z11.59 ENCOUNTER FOR SCREENING FOR OTHER VIRAL DISEASES: ICD-10-CM

## 2020-07-27 LAB
SARS-COV-2 PCR COMMENT: NORMAL
SARS-COV-2 RNA SPEC QL NAA+PROBE: NEGATIVE
SARS-COV-2 VIRUS SPECIMEN SOURCE: NORMAL

## 2020-07-28 ENCOUNTER — SURGERY - HEALTHEAST (OUTPATIENT)
Dept: SURGERY | Facility: HOSPITAL | Age: 80
End: 2020-07-28

## 2020-07-28 ENCOUNTER — ANESTHESIA - HEALTHEAST (OUTPATIENT)
Dept: SURGERY | Facility: HOSPITAL | Age: 80
End: 2020-07-28

## 2020-07-28 ASSESSMENT — MIFFLIN-ST. JEOR: SCORE: 1435.21

## 2021-05-25 ENCOUNTER — RECORDS - HEALTHEAST (OUTPATIENT)
Dept: ADMINISTRATIVE | Facility: CLINIC | Age: 81
End: 2021-05-25

## 2021-05-26 ENCOUNTER — RECORDS - HEALTHEAST (OUTPATIENT)
Dept: ADMINISTRATIVE | Facility: CLINIC | Age: 81
End: 2021-05-26

## 2021-05-27 ENCOUNTER — RECORDS - HEALTHEAST (OUTPATIENT)
Dept: ADMINISTRATIVE | Facility: CLINIC | Age: 81
End: 2021-05-27

## 2021-05-28 ENCOUNTER — RECORDS - HEALTHEAST (OUTPATIENT)
Dept: ADMINISTRATIVE | Facility: CLINIC | Age: 81
End: 2021-05-28

## 2021-05-30 VITALS — HEIGHT: 69 IN | BODY MASS INDEX: 29.56 KG/M2 | WEIGHT: 199.6 LBS

## 2021-05-30 VITALS — BODY MASS INDEX: 29.03 KG/M2 | WEIGHT: 196 LBS | HEIGHT: 69 IN

## 2021-05-30 VITALS — BODY MASS INDEX: 28.73 KG/M2 | HEIGHT: 69 IN | WEIGHT: 194 LBS

## 2021-05-30 VITALS — BODY MASS INDEX: 29.56 KG/M2 | WEIGHT: 199.6 LBS | HEIGHT: 69 IN

## 2021-05-30 VITALS — HEIGHT: 69 IN | BODY MASS INDEX: 29.03 KG/M2 | WEIGHT: 196 LBS

## 2021-05-30 VITALS — BODY MASS INDEX: 27.99 KG/M2 | HEIGHT: 69 IN | WEIGHT: 189 LBS

## 2021-05-31 ENCOUNTER — RECORDS - HEALTHEAST (OUTPATIENT)
Dept: ADMINISTRATIVE | Facility: CLINIC | Age: 81
End: 2021-05-31

## 2021-05-31 VITALS — HEIGHT: 69 IN | WEIGHT: 194 LBS | BODY MASS INDEX: 28.73 KG/M2

## 2021-06-01 ENCOUNTER — RECORDS - HEALTHEAST (OUTPATIENT)
Dept: ADMINISTRATIVE | Facility: CLINIC | Age: 81
End: 2021-06-01

## 2021-06-01 VITALS — HEIGHT: 69 IN | BODY MASS INDEX: 28.14 KG/M2 | WEIGHT: 190 LBS

## 2021-06-01 VITALS — WEIGHT: 194 LBS | HEIGHT: 69 IN | BODY MASS INDEX: 28.73 KG/M2

## 2021-06-01 VITALS — WEIGHT: 193 LBS | BODY MASS INDEX: 28.58 KG/M2 | HEIGHT: 69 IN

## 2021-06-01 VITALS — WEIGHT: 190 LBS | BODY MASS INDEX: 28.14 KG/M2 | HEIGHT: 69 IN

## 2021-06-01 VITALS — HEIGHT: 69 IN | BODY MASS INDEX: 28.44 KG/M2 | WEIGHT: 192 LBS

## 2021-06-01 VITALS — WEIGHT: 183.5 LBS | BODY MASS INDEX: 27.18 KG/M2 | HEIGHT: 69 IN

## 2021-06-01 VITALS — WEIGHT: 199 LBS | BODY MASS INDEX: 29.47 KG/M2 | HEIGHT: 69 IN

## 2021-06-01 VITALS — WEIGHT: 192 LBS | BODY MASS INDEX: 28.44 KG/M2 | HEIGHT: 69 IN

## 2021-06-01 VITALS — BODY MASS INDEX: 28.58 KG/M2 | HEIGHT: 69 IN | WEIGHT: 193 LBS

## 2021-06-01 VITALS — BODY MASS INDEX: 28.38 KG/M2 | HEIGHT: 69 IN | WEIGHT: 191.6 LBS

## 2021-06-01 VITALS — WEIGHT: 190 LBS | HEIGHT: 69 IN | BODY MASS INDEX: 28.14 KG/M2

## 2021-06-04 VITALS — BODY MASS INDEX: 26.53 KG/M2 | WEIGHT: 169 LBS | HEIGHT: 67 IN

## 2021-06-09 ENCOUNTER — RECORDS - HEALTHEAST (OUTPATIENT)
Dept: ADMINISTRATIVE | Facility: CLINIC | Age: 81
End: 2021-06-09

## 2021-06-09 NOTE — PROGRESS NOTES
Calcium Score & cancelled CCTA:  Pt calcium score 277.  Dr. Salcedo consulted and CCTA cancelled as pt heart rate  in NSR with frequent PVCs.  Pt did not take his diltiazem prior to apt.  (states normally takes at 6 am.  Had pills with him and took just prior to apt.) Pt received total of 20 mg IV diltiazem trying to achieve HR <70 without success.  Pt referred back to Dr. Pugh for better heart rate control and follow-up.

## 2021-06-09 NOTE — PROGRESS NOTES
CCx:   Shortness of breath    HPI:   Chadd Gibbons a 76-year-old male who presents to the clinic because of shortness of breath.  Has been ongoing for several years.  States he has decreased exercise tolerance.  He can't walk a block without getting short of breath and having chest heaviness.  It usually lasts a few minutes and then gets better.  States he takes a nitro and his inhaler when this happens and that they help.he recently when going out to dinner, he states that he had to walk half a block and then had to sit down.he also has a cough usually once a day in the morning productive of yellow sputum.    Here recently, he has had problems possible TIA symptoms.  In June of last year patient went to [facility], at that time he apparently stated that he was started on Advair to help with his lungs Ni was told that he had asbestos in his lungs.  Currently being worked up for cardiac disease.    Of note, patient also states he is having dysphagia intermittently.  Has more trouble with with hard solid foods.subsequently, he eats more mechanically soft foods.  No problems with liquids.  Has gotten to the point where he has trouble with pills sometimes.  He actually throws up every once in a while.  States that he had EGD several years ago which showed narrowing at [facility].    Has been exposed to asbestos in the past.as a kid, patient [place] is lots of asbestos around.  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  However he retired approximately 20 years ago after having severe back problems.  Never in a factory.  However, he was about a lot of automotive shops exposed to lots of brake dust.  He also had his house checked for asbestos, none was found.    Work History: sold auto parts  Hobbies: , play golf  Pets: 2 dogs  Tobacco Use: past smoker, 1 pack per month, quit 30 years ago  Home Environment: lives with wife.  Single family home, no  asbestos (had it checked), no mold    PMH:  Past Medical History:   Diagnosis Date     Alcohol abuse 11/14/2007     Anemia      Aneurysm of thoracic aorta 2014    4.5 cm at [facility] in 2014, but 3.9 cm in ascending per MR read by Dr. Bucio in 2016     Aortic stenosis, mild 2016    mild to moderate by echo and MR     Asthma      Bipolar affective disorder 2007    ECT times 6     DM II (diabetes mellitus, type II), controlled 2013     Dyslipidemia, goal to be determined 11/14/2007     Essential hypertension      History of transfusion      MITZI (obstructive sleep apnea)     came off CPAP after 60 pound weight loss     Peripheral neuropathy      Rheumatoid arthritis     sees Dr. Patiño     Syndrome X, cardiac 2010    diagnosed by Dr. Chadd Heredia at [facility] and treated with isosorbide     Transient ischemic attack 10/22/2014     Type 2 diabetes mellitus without complication        PSH:  Past Surgical History:   Procedure Laterality Date     Billroth II  1957    for gastric ulcers     CARDIAC CATHETERIZATION  2005    normal coronary angiogram at [facility] per Dr. Pardeep Hunter     ELECTROCONVULSIVE THERAPY  2007     INGUINAL HERNIA REPAIR Left      LUMBAR DISCECTOMY      done 3 times     SKIN CANCER EXCISION  2009     TOTAL ANKLE ARTHROPLASTY Left 2015    by Dr. Oneal Montelongo, DPM, of [facility]     TRANSURETHRAL RESECTION OF PROSTATE       UMBILICAL HERNIA REPAIR N/A     done 7 times; now okay       SH:  Social History     Social History     Marital status:      Spouse name: Dejan     Number of children: 3     Years of education: N/A     Occupational History     sales of auto parts Retired     [employer] [place]     Social History Main Topics     Smoking status: Former Smoker     Packs/day: 0.05     Years: 4.00     Types: Cigarettes     Quit date: 2/23/1962     Smokeless tobacco: Never Used     Alcohol use No     Drug use: No     Sexual activity: Not on file     Other Topics Concern     Not on file      Social History Narrative    Four grandchildren       Family history:  Family History   Problem Relation Age of Onset     Anesthesia problems Sister      CABG Sister 65     Diabetes type II Mother      Hypertension Mother      Morbid Obesity Mother      CABG Brother 70     Acute Myocardial Infarction Brother 78     Breast cancer Daughter      Lymphoma Son      non Hodgkins     Cancer Brother        ROS:  Review of Systems - History obtained from the patient  General ROS: negative  Psychological ROS: negative  ENT ROS: negative  Allergy and Immunology ROS: negative  Endocrine ROS: negative  Respiratory ROS: positive for - cough and shortness of breath  negative for - pleuritic pain or sputum changes  Cardiovascular ROS: no chest pain or dyspnea on exertion  Gastrointestinal ROS: no abdominal pain, change in bowel habits, or black or bloody stools  Genito-Urinary ROS: no dysuria, trouble voiding, or hematuria  Musculoskeletal ROS: negative  Neurological ROS: no TIA or stroke symptoms  Dermatological ROS: negative      Current Meds:  Current Outpatient Prescriptions   Medication Sig Note     albuterol (PROVENTIL HFA;VENTOLIN HFA) 90 mcg/actuation inhaler Inhale 2 puffs every 6 (six) hours as needed for wheezing.      albuterol (PROVENTIL) 2.5 mg /3 mL (0.083 %) nebulizer solution Take 2.5 mg by nebulization every 6 (six) hours as needed for wheezing.      aspirin 81 mg chewable tablet Chew 81 mg daily.      diltiazem (CARDIZEM CD) 240 MG 24 hr capsule Take 240 mg by mouth daily.      fluticasone-salmeterol (ADVAIR) 250-50 mcg/dose DISKUS Inhale 1 puff bedtime.      gabapentin (NEURONTIN) 300 MG capsule Take 300 mg by mouth every evening.       isosorbide mononitrate (IMDUR) 60 MG 24 hr tablet Take 60 mg by mouth daily.      leflunomide (ARAVA) 20 MG tablet Take 20 mg by mouth bedtime.      metFORMIN (GLUCOPHAGE) 500 MG tablet Take 500 mg by mouth. 3-AM, 2-PM.       methotrexate 25 mg/mL injection weekly 2/6/2017:  Received from: External Pharmacy Received Sig: INJ 0.8 ML SQ Q WEEK     mirtazapine (REMERON) 30 MG tablet Take 30 mg by mouth bedtime.      montelukast (SINGULAIR) 10 mg tablet Take 10 mg by mouth bedtime.      multivitamin therapeutic (THERAGRAN) tablet Take 1 tablet by mouth daily.      nitroglycerin (NITROSTAT) 0.4 MG SL tablet Place 1 tablet (0.4 mg total) under the tongue every 5 (five) minutes as needed for chest pain.      omeprazole (PRILOSEC) 20 MG capsule 1 capsule daily. 3/3/2017: Received from: External Pharmacy Received Sig: TK 1 C PO BID     simvastatin (ZOCOR) 20 MG tablet Take 20 mg by mouth bedtime.        Labs:  No results found for this or any previous visit (from the past 72 hour(s)).    I have personally reviewed all imaging and PFT data available pertinent to this visit.    Imaging studies:  RADIOLOGY REPORT 03/18/2009 CT OF CHEST AND ABDOMEN. HISTORY: Followup abnormal scan liver cyst. TECHNIQUE: The study was done with oral and intravenous contrast. A total of 120 mL of Omnipaque were used. COMPARISON: A comparison is made to a CT of the chest and abdomen dated 11/28/2007. FINDINGS: CHEST: LUNG PARENCHYMA: There are several nodules visible in the right middle lobe and lingula that are stable compared to the prior study all measuring 3 mm in diameter or less. They can be seen in the right middle lobe on images 34, 35, 36, 40 and 41 and in the lingula on images 32 and 39. MEDIASTINUM: No mediastinal or axillary adenopathy is seen. ABDOMEN: The patient has several small liver lesions all less than 1 cm in diameter. They do not fill in on delayed images and are consistent with cysts. They also were present on the prior study. These can be seen on images 42 and 47 series 2. There is a cyst in the anterior right kidney on image 69 series 2 measuring 2.3 cm in diameter. This was present on the prior study and has density measurements near water. It does not enhance on the delayed images. The  "adrenals, spleen, and pancreas appear unremarkable. There are degenerative changes in the thoracic and lumbar spine. There is fairly wide diastasis of the rectus abdominis muscles in the lower abdomen and pelvis. The pelvis was not scanned, however. IMPRESSION: 1. The patient has multiple small nodules in the right middle lobe and lingula that are stable compared to the prior study from November 28, 2007 and very likely represent small granulomas. These reportedly were stable compared to an earlier study from 2005. 2. There are some small cysts in the liver that are stable compared to the prior study. There is also a cyst in the right kidney stable compared to the prior study. 3. The patient has diastasis of the rectus abdominis muscles visible in the mid to lower abdomen.       PFTs:  Spirometry done 3/4/16  FEV1/FVC is 69% and is reduced.  FEV1 is 82% predicted and is normal.  FVC is 90% predicted and normal.    Impression: Spirometry would be consistent with a mild obstruction.  However, given age, FEV1/FVC 69% is well above the lower limit of normal and could be a normal physiologic change seen with aging.  Could be some small airways dysfunction with decreased FEF 25-75.  But, spirometry otherwise normal.        Physical Exam:  Visit Vitals     /64     Pulse (!) 52     Resp 16     Ht 5' 9\" (1.753 m)     Wt 199 lb 9.6 oz (90.5 kg)     SpO2 95%  Comment: RA     BMI 29.48 kg/m2     General - Well nourished  Ears/Mouth - TMs clear bilaterally,  OP pink moist, no thrush  Neck - no cervical lymphadenopathy  Lungs - Clear to ausculation bilaterally   CVS - regular rhythm with no murmurs, rubs or gallups  Abdomen - soft, NT, ND, NABS  Ext - no cyanosis, clubbing or edema  Skin - no rash  Psychology - alert and oriented, answers appropriate      Assessment and Plan:  1. Hx asbestos exposure - He doesn't appear to have any lung disease from exposure to asbestos from imaging or spirometry.  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  He had CT scans in 2005, 2007, and 2009.  Reviewed scans from 2007 and 2009.  Had small tiny stable pulmonary nodules in lower lobes likely from old granulomatous idsease.  No pleural plaques or fibrotic changes.  He brought x-rays with him from January 2016 which appeared rather unremarkable.  Additionally, he brought spirometry from 3/2016 which really didn't reveal much pathology at all.  Would have expected the FVC to be decreased if having significant asbestosis that could be causing fibrotic changes in his lungs and shortness of breath.      2. Mild obstructive disease - could be consistent with COPD.  Spirometry did demonstrate mild obstructive disease based on criteria.  However, given his age, the slightly decreased FEV/FVC ratio falls within the normal range for him.  Does have a history of tobacco use in past.  No emphysematous changes seen on CTs.  Instructed to continue taking rescue inhaler.  Advised that he could stop the advair for a little while and see how he feels, don't believe it's significantly helping him.      3. Dyspnea - do not see a significant pulmonary issue given normal imaging and spirometry that could be causing the degree of exertional dyspnea that he has.  He couldn't walk a half block recently without stopping because of chest heaviness and feeling short of breath.  Currently undergoing more cardiac testing and workup.    Please don't hesitate to contact with me questions or concerns.  Advised to follow up with me if having worsening symptoms and as needed.    Ervin Young MD  Pulmonary and Critical Care Medicine  [phone]

## 2021-06-10 NOTE — ANESTHESIA CARE TRANSFER NOTE
Last vitals:   Vitals:    07/28/20 1045   BP: (P) 116/67   Pulse: (P) 76   Resp: (P) 12   Temp: (P) 36.4  C (97.6  F)   SpO2: (P) 99%     Pt brought to phase 2 on 6L O2. Monitors applied. VSS.    Patient's level of consciousness is drowsy  Spontaneous respirations: yes  Maintains airway independently: yes  Dentition unchanged: yes  Oropharynx: oropharynx clear of all foreign objects    QCDR Measures:  ASA# 20 - Surgical Safety Checklist: WHO surgical safety checklist completed prior to induction    PQRS# 430 - Adult PONV Prevention: NA - Not adult patient, not GA or 3 or more risk factors NOT present  ASA# 8 - Peds PONV Prevention: NA - Not pediatric patient, not GA or 2 or more risk factors NOT present  PQRS# 424 - Carrie-op Temp Management: NA - MAC anesthesia or case < 60 minutes  PQRS# 426 - PACU Transfer Protocol: - Transfer of care checklist used  ASA# 14 - Acute Post-op Pain: ASA14B - Patient did NOT experience pain >= 7 out of 10

## 2021-06-10 NOTE — ANESTHESIA POSTPROCEDURE EVALUATION
Patient: Chadd Henson  Procedure(s):  ESOPHAGOGASTRODUODENOSCOPY WITH  COLONOSCOPY, POLYPECTOMY  Anesthesia type: MAC    Patient location: Phase II Recovery  Last vitals:   Vitals Value Taken Time   /83 7/28/2020 11:30 AM   Temp 36.4  C (97.6  F) 7/28/2020 10:45 AM   Pulse 69 7/28/2020 11:37 AM   Resp 16 7/28/2020 11:15 AM   SpO2 95 % 7/28/2020 11:37 AM   Vitals shown include unvalidated device data.  Post vital signs: stable  Level of consciousness: awake and responds to simple questions  Post-anesthesia pain: pain controlled  Post-anesthesia nausea and vomiting: no  Pulmonary: unassisted, return to baseline  Cardiovascular: stable and blood pressure at baseline  Hydration: adequate  Anesthetic events: no    QCDR Measures:  ASA# 11 - Carrie-op Cardiac Arrest: ASA11B - Patient did NOT experience unanticipated cardiac arrest  ASA# 12 - Carrie-op Mortality Rate: ASA12B - Patient did NOT die  ASA# 13 - PACU Re-Intubation Rate: NA - No ETT / LMA used for case  ASA# 10 - Composite Anes Safety: ASA10A - No serious adverse event    Additional Notes:

## 2021-06-10 NOTE — ANESTHESIA PREPROCEDURE EVALUATION
Anesthesia Evaluation      Patient summary reviewed     Airway   Mallampati: III   Pulmonary - normal exam   (+) COPD mild, asthma  mild, sleep apnea,                          Cardiovascular - negative ROS  Exercise tolerance: > or = 4 METS  (+) hypertension, CAD, dysrhythmias, cardiomyopathy, hypercholesterolemia,     Rhythm: regular  Rate: normal,    murmur      Neuro/Psych    (+) neuromuscular disease,  CVA ,     Endo/Other    (+) diabetes mellitus well controlled, arthritis,      GI/Hepatic/Renal    (+) GERD well controlled,             Dental    (+) lower dentures and upper dentures                         Anesthesia Plan  Planned anesthetic: MAC    ASA 3   Induction: intravenous   Anesthetic plan and risks discussed with: patient and spouse

## 2021-06-16 NOTE — PROGRESS NOTES
Assessment/Plan:     1. Cardiomyopathy with systolic dysfunction, NYHA class III: Chadd Henson appears not compensated.  He complains of dyspnea on exertion and a chronic cough for the last month.  He has been on doxycycline on and off for his cough.  He states that this has not helped.  I started him on low-dose Lasix 20 mg daily.  BMP pending.  We discussed monitoring heart failure symptoms, following a low-sodium diet, monitoring daily weights, and heart failure treatment.  He did not tolerate losartan and developed a rash and angioedema.  This has been discontinued and placed on his allergy list.  Discussed possibly starting metoprolol in the future and decreasing diltiazem per Dr. Pugh.  He met with a heart failure nurse clinician to discuss heart failure management.        Heart failure treatment includes:  - Diuretic therapy with furosemide 20 mg daily    2. Obstructive sleep apnea: He used to wear a CPAP in the past.  He states this has been years.  He has lost 50-60 pounds and stopped wearing his CPAP.  He never followed up with his sleep medicine provider.  His [place] sleepiness score was 12 and STOP bang score of 5 today.  We discussed the correlation between heart failure and obstructive sleep apnea.  I will place a referral for sleep medicine.    Follow-up in the heart failure clinic in 2 weeks and with Dr. Pugh in May    Subjective:     Chadd Henson is seen at [facility] heart failure clinic today for continued follow-up.  His wife accompanies him today.  He follows up for cardiomyopathy with systolic dysfunction.  His most recent echocardiogram was done February 14, 2018 which showed an ejection fraction of 40% along with moderate aortic stenosis.  He has a past medical history significant for hypertension, hyperlipidemia, COPD, diabetes, and obstructive sleep apnea.  He also has a history of TIA.  He has a history of premature atrial contractions.    During the last clinic visit,  Dr. Pugh started him on losartan 12.5 mg daily. He had an allergic reaction with angioedema and a rash and stopped this medication. Today, his rash continues to improve and has no symptoms of angioedema.  He complains of fatigue, occasional bloating, and dyspnea on exertion.  He denies any orthopnea or PND.  He denies lightheadedness, shortness of breath, orthopnea, PND, palpitations, chest pain and lower extremity edema.      He is monitoring home weights which are stable between 185-190 pounds.  He is trying to follow a low sodium diet.     Review of Systems:   General: WNL  Eyes: WNL  Ears/Nose/Throat: WNL  Lungs: Cough, Shortness of Breath, Wheezing  Heart: Chest Pain, Shortness of Breath with activity  Stomach: Heartburn  Bladder: Frequent Urination at Night  Muscle/Joints: WNL  Skin: Poor Wound Healing  Nervous System: Loss of Balance  Mental Health: Depression     Blood: WNL     Patient Active Problem List   Diagnosis     Moderate aortic stenosis     Essential hypertension     Type 2 diabetes mellitus without complication     Dyslipidemia, goal LDL below 100     Cardiomyopathy     Heart failure with reduced ejection fraction       Past Medical History:   Diagnosis Date     Alcohol abuse 11/14/2007     Anemia      Aneurysm of thoracic aorta 2014    4.5 cm at [facility] in 2014, but 3.9 cm in ascending per MR read by Dr. Bucio in 2016     Atrial premature beats 4/17/2017     Bipolar affective disorder 2007    ECT times 6     Cardiomyopathy 3/14/2017     Chest pain with normal coronary angiography 03/14/2005    per Dr. Hunter at [facility]     COPD (chronic obstructive pulmonary disease)     mild at most per Dr. Evrin Young; Robinson gets montelukast and several inhalers from other MD's     DM II (diabetes mellitus, type II), controlled 2013     Dyslipidemia, goal LDL below 100      Essential hypertension      Gastroesophageal reflux disease 11/14/2007    Overview:  On ranitidine     History of transfusion      Moderate  aortic stenosis 2017    mild to moderate by echo and MR     Nonocclusive coronary atherosclerosis of native coronary artery 2010    25% plaques per Dr. Verdugo at [facility] angio; Agatson score of 277 in 2017     MITZI (obstructive sleep apnea)     came off CPAP after 60 pound weight loss     Peripheral neuropathy      Rheumatoid arthritis     sees Dr. Johnson at [facility]; on methotrexate in 2017     Syndrome X, cardiac 2010    diagnosed by Dr. Chadd Heredia at [facility] and treated with isosorbide     TIA (transient ischemic attack) 10/22/2014    Another two episodes in 2017 when he tried stopping ASA for three weeks. Per Dr. Ronald Saucedo, workup with Dr. Hsu of Neurology in  did not find a cause. He recommended ASA and Dr. Saucedo told patient that he will need to accept the bleeding risk of ASA.       Past Surgical History:   Procedure Laterality Date     Billroth II      for gastric ulcers     CARDIAC CATHETERIZATION  2005    normal coronary angiogram at [facility] per Dr. Pardeep Hunter     CARDIAC CATHETERIZATION  2010    nonocclusive coronary plaques per Dr. Verdugo (25%)     ELECTROCONVULSIVE THERAPY  2007     INGUINAL HERNIA REPAIR Left      LUMBAR DISCECTOMY      done 3 times     SKIN CANCER EXCISION  2009     TOTAL ANKLE ARTHROPLASTY Left 2015    by Dr. Oneal Montelongo, [place], of [facility]     TRANSURETHRAL RESECTION OF PROSTATE       UMBILICAL HERNIA REPAIR      done 7 times; now okay       Family History   Problem Relation Age of Onset     Anesthesia problems Sister      CABG Sister 65     Valvular heart disease Sister      Coronary Stenting Sister      Pacemaker Sister      Diabetes type II Mother      Hypertension Mother      Morbid Obesity Mother      Stroke Mother 56     cerebral hemorrhage at autopsy     Aortic aneurysm Father 65     ruptured AAA,  two days after surgery in [place]     CABG Brother 70     Acute Myocardial Infarction  Brother 78      in air evac helicopter between [facility]     Coronary Stenting Brother      Breast cancer Daughter      Lymphoma Son      non Hodgkins ( at [facility])     Cancer Brother      No Medical Problems Daughter        Social History     Social History     Marital status:      Spouse name: Dejan     Number of children: 3     Years of education: N/A     Occupational History     sales of auto parts Retired     [employer] [place]     Social History Main Topics     Smoking status: Former Smoker     Packs/day: 0.05     Years: 4.00     Types: Cigarettes     Quit date: 1962     Smokeless tobacco: Never Used     Alcohol use No     Drug use: No     Sexual activity: Not on file     Other Topics Concern     Not on file     Social History Narrative    Four grandchildren       Current Outpatient Prescriptions   Medication Sig Dispense Refill     albuterol (PROVENTIL HFA;VENTOLIN HFA) 90 mcg/actuation inhaler Inhale 2 puffs every 6 (six) hours as needed for wheezing.       albuterol (PROVENTIL) 2.5 mg /3 mL (0.083 %) nebulizer solution Take 2.5 mg by nebulization every 6 (six) hours as needed for wheezing.       aspirin 81 mg chewable tablet Chew 81 mg daily.       diltiazem (CARDIZEM CD) 240 MG 24 hr capsule Take 240 mg by mouth daily.       doxycycline (VIBRAMYCIN) 100 MG capsule Take 100 mg by mouth 2 (two) times a day.  0     ETANERCEPT (ENBREL SUBQ) Inject under the skin once a week.       fluticasone-salmeterol (ADVAIR) 250-50 mcg/dose DISKUS Inhale 1 puff bedtime.       gabapentin (NEURONTIN) 300 MG capsule Take 300 mg by mouth every evening.        hydrOXYzine (VISTARIL) 50 MG capsule Take 1 capsule by mouth as needed.  2     isosorbide mononitrate (IMDUR) 60 MG 24 hr tablet Take 1 tablet by mouth daily.  3     leflunomide (ARAVA) 20 MG tablet Take 20 mg by mouth bedtime.       metFORMIN (GLUCOPHAGE) 500 MG tablet Take 500 mg by mouth. 3-AM, 2-PM.        mirtazapine  (REMERON) 30 MG tablet Take 30 mg by mouth bedtime.       montelukast (SINGULAIR) 10 mg tablet Take 10 mg by mouth bedtime.       multivitamin therapeutic (THERAGRAN) tablet Take 1 tablet by mouth daily.       nitroglycerin (NITROSTAT) 0.4 MG SL tablet Place 0.4 mg under the tongue as needed.       omeprazole (PRILOSEC) 20 MG capsule Take 1 capsule by mouth daily.   2     predniSONE (DELTASONE) 10 mg tablet Take 10 mg by mouth 2 (two) times a day.  0     simvastatin (ZOCOR) 10 MG tablet Take 1 tablet by mouth at bedtime.  3     furosemide (LASIX) 20 MG tablet Take 1 tablet (20 mg total) by mouth daily. 90 tablet 0     methotrexate 2.5 MG tablet Take 6 tablets by mouth once a week.  1     methotrexate 25 mg/mL injection weekly  1     No current facility-administered medications for this visit.        Allergies   Allergen Reactions     Codeine Other (See Comments)     Kidney and liver shuts down and sharp stomach pain       Crestor [Rosuvastatin]      Upset stomach     Morphine Other (See Comments)     Kidneys and liver shut down     Vicodin [Hydrocodone-Acetaminophen] Other (See Comments)     Sharp stomach pains     Losartan Angiodema       Objective:     Vitals:    03/08/18 0846   BP: 116/62   Pulse: 72   Resp: 16     Wt Readings from Last 3 Encounters:   03/08/18 192 lb (87.1 kg)   02/20/18 199 lb (90.3 kg)   02/14/18 194 lb (88 kg)       General Appearance:   Alert, cooperative and in no acute distress.   HEENT:  No scleral icterus; the mucous membranes were pink and moist.   Neck: JVP normal. No HJR.   Chest: The spine was straight. The chest was symmetric.   Lungs:   Respirations unlabored; the lungs are clear to auscultation.   Cardiovascular:   Regular rhythm with ectopic beat. S1 and S2 without murmur, clicks or rubs. Radial and posterior tibial pulses are intact and symmetrical.    Abdomen:  Soft, nontender, nondistended, bowel sounds present   Extremities: No cyanosis, clubbing, or edema.   Skin: No  xanthelasma.   Neurologic: Mood and affect are appropriate.         Lab Review   Lab Results   Component Value Date    CREATININE 0.73 10/31/2016    BUN 8 10/31/2016     10/31/2016    K 4.4 10/31/2016     (H) 10/31/2016    CO2 20 (L) 10/31/2016     Lab Results   Component Value Date     (H) 02/14/2018     BNP (pg/mL)   Date Value   02/14/2018 105 (H)   03/24/2017 41   02/06/2017 55     Creatinine (mg/dL)   Date Value   10/31/2016 0.73   09/19/2016 0.77   05/24/2016 0.73   05/09/2016 0.85   05/09/2016 0.84       Cardiographics  Echocardiogram: 2/14/2018  Summary     Left Ventricle: Normal left ventricular size.The estimated left ventricular ejection fraction is 40%. This represents a moderately decreased ejection fraction. Mild concentric hypertrophy noted.    Right Ventricle: Normal right ventricular size and systolic function.    Left Atrium: Left atrial volume is moderately increased.    Aortic Valve: Moderate calcific aortic stenosis (peak bailey: 3.1 m/sec, mean gradient: 26 mm Hg, JAMSHID: 1.2 cm2, SVi:41.5ml/m2, DI:0.3) . Mild aortic regurgitation.    Mild tricuspid valve regurgitation. No pulmonary hypertension present. The estimated systolic pulmonary artery pressure is 24 mmhg.    When compared to the previous study dated 2/17/2017, interval decline in left ventricular systolic function. No signficant change in aortic valve stenosis.            40 minutes were spent face to face with the patient with greater than 50% spent on education and counseling.      Madeleine Zurita, [facility]   Heart Failure Clinic

## 2021-06-16 NOTE — PROGRESS NOTES
Heart failure Education for Tandem Visits    Patient seen in clinic today for continued HF education.  Patient viewed 'What is heart Failure' video which covers risk factors, symptoms, medications, Na and fluid guidelines, balancing activity and rest, and daily monitoring of symptoms.      Patient offered no questions/concerns - agreed to sched f/u in HF clinic in 3wks, with Dr. Pugh in May, 2018 and call during interim if any new problems.  mg

## 2021-06-16 NOTE — PROGRESS NOTES
Order for sleep study received, reviewed, and approved.     High risk for central respiratory events  EF = 40% no ASV.

## 2021-06-16 NOTE — PROGRESS NOTES
Patient seen in clinic for HF education s/p clinic visit with Dr. Pugh 2/20/18.  Reviewed HF Binder that includes the  HF Sx Awareness & Action plan  handout and  A Stronger Pump  booklet and Weight log booklet highlighting :  __X_patient s type of heart failure _X__Na management in diet  __X_importance of daily wts  _X__Fluid Guidelines, if applicable  __X_medication review and importance of compliance     Instructed patient in signs and sx of heart failure, reiterated when to call clinic - reviewed HF hotline # [phone] and after hours call # [phone].  Majority of time was spent reviewing: Obtaining and preparing low sodium meals at home versus eating out. Patient and his wife are in the habit of eating out and will attempt preparing meals at home without adding salt while cooking and at the table.  Patient verbalized understanding of HF discussion.  Plan for f/u with continued HF education reviewed.

## 2021-06-16 NOTE — PROGRESS NOTES
Assessment/Plan:     1. Cardiomyopathy with systolic dysfunction, NYHA class II: Chadd Henson appears well compensated.  No signs of fluid retention.  He states his dyspnea on exertion and cough have improved with starting Lasix 20 mg daily.  His weights have remained stable.  He is following a low-sodium diet.  He continues to have fatigue.  I decreased his diltiazem to 180 mg daily.  I started him on metoprolol succinate 25 mg daily.  I encouraged him to monitor his heart rates and call us if his heart rate is greater than 100 continuously.  He had a reaction to losartan which has been discontinued.  He watched the heart failure video today.      Heart failure treatment includes:  - Beta blocker therapy with metroprolol succinate 25 mg daily  - Diuretic therapy with furosemide 20 mg daily    2. Obstructive sleep apnea: He has a sleep study on April 2.  He has had sleep apnea in the past but has not been wearing a CPAP.    Follow-up with Dr. Pugh in May and in the heart failure clinic in 3 weeks    Subjective:     Chadd Henson is seen at [facility] heart failure clinic today for continued follow-up.  He follows up for cardiomyopathy with systolic dysfunction.  His most recent echocardiogram was done February 14, 2018 which showed an ejection fraction of 40% along with moderate aortic stenosis.  He has a past medical history significant for hypertension, hyperlipidemia, COPD, diabetes, and obstructive sleep apnea.  He has a history of TIA.  He also has a history of premature atrial contractions.    During the last clinic visit, I started him on Lasix 20 mg daily for his cough.  Today, he states his cough has improved and is ultimately gone.  He states his dyspnea on exertion has also improved.  He continues to have fatigue.  He denies any dizziness or lightheadedness.  Denies any orthopnea or PND.  He denies lightheadedness, shortness of breath, orthopnea, PND, palpitations, chest pain, abdominal  fullness/bloating and lower extremity edema.      He is monitoring home weights which are stable between 186-187 pounds.  He is following a low sodium diet.       Review of Systems:   General: WNL  Eyes: WNL  Ears/Nose/Throat: WNL  Lungs: WNL  Heart: Shortness of Breath with activity  Stomach: WNL  Bladder: Frequent Urination at Night  Muscle/Joints: WNL  Skin: Poor Wound Healing  Nervous System: WNL  Mental Health: WNL     Blood: WNL     Patient Active Problem List   Diagnosis     Moderate aortic stenosis     Essential hypertension     Type 2 diabetes mellitus without complication     Dyslipidemia, goal LDL below 100     Cardiomyopathy     Heart failure with reduced ejection fraction       Past Medical History:   Diagnosis Date     Alcohol abuse 11/14/2007     Anemia      Aneurysm of thoracic aorta 2014    4.5 cm at [facility] in 2014, but 3.9 cm in ascending per MR read by Dr. Bucio in 2016     Atrial premature beats 4/17/2017     Bipolar affective disorder 2007    ECT times 6     Cardiomyopathy 3/14/2017     Chest pain with normal coronary angiography 03/14/2005    per Dr. Hunter at [facility]     COPD (chronic obstructive pulmonary disease)     mild at most per Dr. Ervin Young; Robinson gets montelukast and several inhalers from other MD's     DM II (diabetes mellitus, type II), controlled 2013     Dyslipidemia, goal LDL below 100      Essential hypertension      Gastroesophageal reflux disease 11/14/2007    Overview:  On ranitidine     History of transfusion      Moderate aortic stenosis 2/1/2017    mild to moderate by echo and MR     Nonocclusive coronary atherosclerosis of native coronary artery 01/11/2010    25% plaques per Dr. Verdugo at [facility] angio; Agatson score of 277 in 2017     MITZI (obstructive sleep apnea)     came off CPAP after 60 pound weight loss     Peripheral neuropathy      Rheumatoid arthritis     sees Dr. Johnson at [facility]; on methotrexate in 2017     Syndrome X, cardiac 2010    diagnosed  by Dr. Chadd Heredia at [facility] and treated with isosorbide     TIA (transient ischemic attack) 10/22/2014    Another two episodes in 2017 when he tried stopping ASA for three weeks. Per Dr. Ronald Saucedo, workup with Dr. Hsu of Neurology in  did not find a cause. He recommended ASA and Dr. Saucedo told patient that he will need to accept the bleeding risk of ASA.       Past Surgical History:   Procedure Laterality Date     Billroth II      for gastric ulcers     CARDIAC CATHETERIZATION  2005    normal coronary angiogram at [facility] per Dr. Pardeep Hunter     CARDIAC CATHETERIZATION  2010    nonocclusive coronary plaques per Dr. Verdugo (25%)     ELECTROCONVULSIVE THERAPY       INGUINAL HERNIA REPAIR Left      LUMBAR DISCECTOMY      done 3 times     SKIN CANCER EXCISION       TOTAL ANKLE ARTHROPLASTY Left 2015    by Dr. Oneal Montelongo, DPM, of [facility]     TRANSURETHRAL RESECTION OF PROSTATE       UMBILICAL HERNIA REPAIR      done 7 times; now okay       Family History   Problem Relation Age of Onset     Anesthesia problems Sister      CABG Sister 65     Valvular heart disease Sister      Coronary Stenting Sister      Pacemaker Sister      Diabetes type II Mother      Hypertension Mother      Morbid Obesity Mother      Stroke Mother 56     cerebral hemorrhage at autopsy     Aortic aneurysm Father 65     ruptured AAA,  two days after surgery in [place]     CABG Brother 70     Acute Myocardial Infarction Brother 78      in air evac helicopter between [place] and [place]     Coronary Stenting Brother      Breast cancer Daughter      Lymphoma Son      non Hodgkins ( at [facility])     Cancer Brother      No Medical Problems Daughter        Social History     Social History     Marital status:      Spouse name: Dejan     Number of children: 3     Years of education: N/A     Occupational History     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     Social History Main Topics     Smoking status: Former Smoker     Packs/day: 0.05     Years: 4.00     Types: Cigarettes     Quit date: 2/23/1962     Smokeless tobacco: Never Used     Alcohol use No     Drug use: No     Sexual activity: Not on file     Other Topics Concern     Not on file     Social History Narrative    Four grandchildren       Current Outpatient Prescriptions   Medication Sig Dispense Refill     albuterol (PROVENTIL HFA;VENTOLIN HFA) 90 mcg/actuation inhaler Inhale 2 puffs every 6 (six) hours as needed for wheezing.       albuterol (PROVENTIL) 2.5 mg /3 mL (0.083 %) nebulizer solution Take 2.5 mg by nebulization every 6 (six) hours as needed for wheezing.       aspirin 81 mg chewable tablet Chew 81 mg daily.       diltiazem (CARDIZEM CD) 240 MG 24 hr capsule Take 240 mg by mouth daily.       doxycycline (VIBRAMYCIN) 100 MG capsule Take 100 mg by mouth 2 (two) times a day.  0     ETANERCEPT (ENBREL SUBQ) Inject under the skin once a week.       fluticasone-salmeterol (ADVAIR) 250-50 mcg/dose DISKUS Inhale 1 puff bedtime.       furosemide (LASIX) 20 MG tablet Take 1 tablet (20 mg total) by mouth daily. 90 tablet 0     gabapentin (NEURONTIN) 300 MG capsule Take 300 mg by mouth every evening.        hydrOXYzine (VISTARIL) 50 MG capsule Take 1 capsule by mouth as needed.  2     isosorbide mononitrate (IMDUR) 60 MG 24 hr tablet Take 1 tablet by mouth daily.  3     leflunomide (ARAVA) 20 MG tablet Take 20 mg by mouth bedtime.       metFORMIN (GLUCOPHAGE) 500 MG tablet Take 500 mg by mouth. 3-AM, 2-PM.        mirtazapine (REMERON) 30 MG tablet Take 30 mg by mouth bedtime.       montelukast (SINGULAIR) 10 mg tablet Take 10 mg by mouth bedtime.       multivitamin therapeutic (THERAGRAN) tablet Take 1 tablet by mouth daily.       nitroglycerin (NITROSTAT) 0.4 MG SL tablet Place 0.4 mg under the tongue as needed.       omeprazole (PRILOSEC) 20 MG capsule Take 1 capsule by mouth daily.   2      simvastatin (ZOCOR) 10 MG tablet Take 1 tablet by mouth at bedtime.  3     diltiazem (CARDIZEM CD) 180 MG 24 hr capsule Take 1 capsule (180 mg total) by mouth daily. 30 capsule 2     methotrexate 2.5 MG tablet Take 6 tablets by mouth once a week.  1     methotrexate 25 mg/mL injection weekly  1     metoprolol succinate (TOPROL-XL) 25 MG Take 1 tablet (25 mg total) by mouth daily. 90 tablet 3     predniSONE (DELTASONE) 10 mg tablet Take 10 mg by mouth 2 (two) times a day.  0     No current facility-administered medications for this visit.        Allergies   Allergen Reactions     Codeine Other (See Comments)     Kidney and liver shuts down and sharp stomach pain       Crestor [Rosuvastatin]      Upset stomach     Morphine Other (See Comments)     Kidneys and liver shut down     Vicodin [Hydrocodone-Acetaminophen] Other (See Comments)     Sharp stomach pains     Losartan Angiodema       Objective:     Vitals:    03/22/18 0756   BP: 112/64   Pulse: 90   Resp: 18     Wt Readings from Last 3 Encounters:   03/22/18 190 lb (86.2 kg)   03/08/18 192 lb (87.1 kg)   02/20/18 199 lb (90.3 kg)       General Appearance:   Alert, cooperative and in no acute distress.   HEENT:  No scleral icterus; the mucous membranes were pink and moist.   Neck: JVP normal. No HJR.   Chest: The spine was straight. The chest was symmetric.   Lungs:   Respirations unlabored; the lungs are clear to auscultation.   Cardiovascular:   Regular rhythm with ectopic beat. S1 and S2 without murmur, clicks or rubs. Radial and posterior tibial pulses are intact and symmetrical.    Abdomen:  Soft, nontender, nondistended, bowel sounds present   Extremities: No cyanosis, clubbing, or edema.   Skin: No xanthelasma.   Neurologic: Mood and affect are appropriate.         Lab Review   Lab Results   Component Value Date    CREATININE 0.79 03/19/2018    BUN 6 (L) 03/19/2018     03/19/2018    K 4.3 03/19/2018     03/19/2018    CO2 28 03/19/2018     Lab  Results   Component Value Date     (H) 02/14/2018     BNP (pg/mL)   Date Value   02/14/2018 105 (H)   03/24/2017 41   02/06/2017 55     Creatinine (mg/dL)   Date Value   03/19/2018 0.79   03/08/2018 0.85   10/31/2016 0.73   09/19/2016 0.77       Cardiographics  Echocardiogram: 2/14/2018  Summary     Left Ventricle: Normal left ventricular size.The estimated left ventricular ejection fraction is 40%. This represents a moderately decreased ejection fraction. Mild concentric hypertrophy noted.    Right Ventricle: Normal right ventricular size and systolic function.    Left Atrium: Left atrial volume is moderately increased.    Aortic Valve: Moderate calcific aortic stenosis (peak bailey: 3.1 m/sec, mean gradient: 26 mm Hg, JAMSHID: 1.2 cm2, SVi:41.5ml/m2, DI:0.3) . Mild aortic regurgitation.    Mild tricuspid valve regurgitation. No pulmonary hypertension present. The estimated systolic pulmonary artery pressure is 24 mmhg.    When compared to the previous study dated 2/17/2017, interval decline in left ventricular systolic function. No signficant change in aortic valve stenosis.           25 minutes were spent face to face with the patient with greater than 50% spent on education and counseling.      Madeleine Zurita, [facility]   Heart Failure Clinic

## 2021-06-17 NOTE — PROGRESS NOTES
Dear Dr. Ronald Saucedo Md  [address] [place] [address] [address]  [place] [address]    Thank you for the opportunity to participate in the care of Mr. Chadd Henson.    He is a 77 y.o. male who comes to the clinic for the review of his sleep study.  The patient underwent a direct sleep study on 04/02/18 as part of the workup for his cardiomyopathy.  The sleep study revealed that he had upper airway resistance syndrome and periodic limb movement sleep.  His sleep efficiency was excellent on the night of the study.  The patient denies any episodes of excessive daytime sleepiness, witnessed apnea, or loud snoring.  The patient's review of systems is otherwise unremarkable.     Ideal Sleep-Wake Cycle(devoid of societal pressure):    Patient would try to initiate sleep at around 9:30 PM with a sleep latency of 10 minutes. The patient would have 2 nocturnal awakening. Final wake up time is around 5:30 AM.    Past Medical History  Past Medical History:   Diagnosis Date     Alcohol abuse 11/14/2007     Anemia      Aneurysm of thoracic aorta 2014    4.5 cm at [facility] in 2014, but 3.9 cm in ascending per MR read by Dr. Bucio in 2016     Atrial premature beats 4/17/2017     Bipolar affective disorder 2007    ECT times 6     Cardiomyopathy 3/14/2017     Chest pain with normal coronary angiography 03/14/2005    per Dr. Hunter at [facility]     COPD (chronic obstructive pulmonary disease)     mild at most per Dr. Ervin Young; Robinson gets montelukast and several inhalers from other MD's     DM II (diabetes mellitus, type II), controlled 2013     Dyslipidemia, goal LDL below 100      Essential hypertension      Gastroesophageal reflux disease 11/14/2007    Overview:  On ranitidine     History of transfusion      Moderate aortic stenosis 2/1/2017    mild to moderate by echo and MR     Nonocclusive coronary atherosclerosis of native coronary artery 01/11/2010    25% plaques per Dr. Verdugo at [facility] angio; Agatson score of 277 in 2017     MITZI  (obstructive sleep apnea)     came off CPAP after 60 pound weight loss     Peripheral neuropathy      Rheumatoid arthritis     sees Dr. Johnson at [facility]; on methotrexate in 2017     Syndrome X, cardiac 2010    diagnosed by Dr. Chadd Heredia at [facility] and treated with isosorbide     TIA (transient ischemic attack) 10/22/2014    Another two episodes in Feb 2017 when he tried stopping ASA for three weeks. Per Dr. Ronald Saucedo, workup with Dr. Hsu of Neurology in 2014 did not find a cause. He recommended ASA and Dr. Saucedo told patient that he will need to accept the bleeding risk of ASA.        Past Surgical History  Past Surgical History:   Procedure Laterality Date     Billroth II  1957    for gastric ulcers     CARDIAC CATHETERIZATION  03/14/2005    normal coronary angiogram at [facility] per Dr. Pardeep Hunter     CARDIAC CATHETERIZATION  01/11/2010    nonocclusive coronary plaques per Dr. Verdugo (25%)     ELECTROCONVULSIVE THERAPY  2007     INGUINAL HERNIA REPAIR Left      LUMBAR DISCECTOMY      done 3 times     SKIN CANCER EXCISION  2009     TOTAL ANKLE ARTHROPLASTY Left 2015    by Dr. Oneal Montelongo, DPM, of [facility]     TRANSURETHRAL RESECTION OF PROSTATE       UMBILICAL HERNIA REPAIR      done 7 times; now okay        Meds  Current Outpatient Prescriptions   Medication Sig Dispense Refill     albuterol (PROVENTIL HFA;VENTOLIN HFA) 90 mcg/actuation inhaler Inhale 2 puffs every 6 (six) hours as needed for wheezing.       albuterol (PROVENTIL) 2.5 mg /3 mL (0.083 %) nebulizer solution Take 2.5 mg by nebulization every 6 (six) hours as needed for wheezing.       aspirin 81 mg chewable tablet Chew 81 mg daily.       doxycycline (VIBRAMYCIN) 100 MG capsule Take 100 mg by mouth 2 (two) times a day.  0     ETANERCEPT (ENBREL SUBQ) Inject under the skin once a week.       fluticasone-salmeterol (ADVAIR) 250-50 mcg/dose DISKUS Inhale 1 puff bedtime.       furosemide (LASIX)  20 MG tablet Take 1 tablet (20 mg total) by mouth daily. 90 tablet 0     gabapentin (NEURONTIN) 300 MG capsule Take 300 mg by mouth every evening.        hydrOXYzine (VISTARIL) 50 MG capsule Take 1 capsule by mouth as needed.  2     isosorbide mononitrate (IMDUR) 60 MG 24 hr tablet Take 1 tablet by mouth daily.  3     metFORMIN (GLUCOPHAGE) 500 MG tablet Take 500 mg by mouth. 3-AM, 2-PM.        methotrexate 2.5 MG tablet Take 6 tablets by mouth once a week.  1     methotrexate 25 mg/mL injection weekly  1     metoprolol succinate (TOPROL-XL) 25 MG Take 1 tablet (25 mg total) by mouth daily. 90 tablet 3     mirtazapine (REMERON) 30 MG tablet Take 30 mg by mouth bedtime.       montelukast (SINGULAIR) 10 mg tablet Take 10 mg by mouth bedtime.       multivitamin therapeutic (THERAGRAN) tablet Take 1 tablet by mouth daily.       nitroglycerin (NITROSTAT) 0.4 MG SL tablet Place 0.4 mg under the tongue as needed.       omeprazole (PRILOSEC) 20 MG capsule Take 1 capsule by mouth daily.   2     predniSONE (DELTASONE) 10 mg tablet Take 10 mg by mouth 2 (two) times a day.  0     simvastatin (ZOCOR) 10 MG tablet Take 1 tablet by mouth at bedtime.  3     No current facility-administered medications for this visit.         Allergies  Codeine; Crestor [rosuvastatin]; Morphine; Vicodin [hydrocodone-acetaminophen]; and Losartan     Social History  Social History     Social History     Marital status:      Spouse name: Dejan     Number of children: 3     Years of education: N/A     Occupational History     sales of [employer]     Social History Main Topics     Smoking status: Former Smoker     Packs/day: 0.05     Years: 4.00     Types: Cigarettes     Quit date: 2/23/1962     Smokeless tobacco: Never Used     Alcohol use No     Drug use: No     Sexual activity: Not on file     Other Topics Concern     Not on file     Social History Narrative    Four grandchildren        Family History  Family  "History   Problem Relation Age of Onset     Anesthesia problems Sister      CABG Sister 65     Valvular heart disease Sister      Coronary Stenting Sister      Pacemaker Sister      Diabetes type II Mother      Hypertension Mother      Morbid Obesity Mother      Stroke Mother 56     cerebral hemorrhage at autopsy     Aortic aneurysm Father 65     ruptured AAA,  two days after surgery in [place]     CABG Brother 70     Acute Myocardial Infarction Brother 78      in air evac helicopter between [place] and [place]     Coronary Stenting Brother      Breast cancer Daughter      Lymphoma Son      non Hodgkins ( at [facility])     Cancer Brother      No Medical Problems Daughter         Review of Systems:  Constitutional: Negative except as noted in HPI.   Eyes: Negative except as noted in HPI.   ENT: Negative except as noted in HPI.   Cardiovascular: Negative except as noted in HPI.   Respiratory: Negative except as noted in HPI.   Gastrointestinal: Negative except as noted in HPI.   Genitourinary: Negative except as noted in HPI.   Musculoskeletal: Negative except as noted in HPI.   Integumentary: Negative except as noted in HPI.   Neurological: Negative except as noted in HPI.   Psychiatric: Negative except as noted in HPI.   Endocrine: Negative except as noted in HPI.   Hematologic/Lymphatic: Negative except as noted in HPI.      Physical Exam:  /74  Pulse 84  Ht 5' 8.5\" (1.74 m)  Wt 191 lb 9.6 oz (86.9 kg)  SpO2 97%  BMI 28.71 kg/m2  BMI:Body mass index is 28.71 kg/(m^2).   GEN: NAD, appropriate for age  Psych: normal mood, normal affect     Labs/Studies:     Lab Results   Component Value Date    WBC 6.2 2016    HGB 12.7 (L) 2017    HCT 32.8 (L) 2016    MCV 79 (L) 2016     2016         Chemistry        Component Value Date/Time     2018 0828    K 4.3 2018 0828     2018 0828    CO2 28 2018 0828    BUN 6 (L) 2018 " 0828    CREATININE 0.79 03/19/2018 0828     (H) 03/19/2018 0828        Component Value Date/Time    CALCIUM 9.3 03/19/2018 0828    ALKPHOS 71 03/19/2018 0828    AST 18 03/19/2018 0828    ALT 19 03/19/2018 0828    BILITOT 0.4 03/19/2018 0828            Lab Results   Component Value Date    FERRITIN 11 (L) 09/19/2016     Lab Results   Component Value Date    TSH 2.05 03/29/2017         Assessment and Plan:  In summary Chadd Henson is a 77 y.o. year old male here for review of his sleep study.  1.  Upper airway resistance syndrome  I explained to the patient the underlying pathophysiology of upper airway resistance syndrome.  We discussed the different treatment options available including oral appliance versus positional therapy.  The patient declined any active interventions at this point in time.  2.  Periodic limb movement in sleep  Most likely will resolve after optimal treatment of upper airway resistance syndrome.  The patient declined further workup at this time.  3.  Other sleep disturbance    Patient verbalized understanding of these issues, agrees with the plan and all questions were answered today. Patient was given an opportuntity to voice any other symptoms or concerns not listed above. Patient did not have any other symptoms or concerns.      Follow-up as needed     Gustabo Cortez DO  Board Certified in Internal Medicine and Sleep Medicine  [facility].    (Note created with Dragon voice recognition and unintended spelling errors and word substitutions may occur)

## 2021-06-18 NOTE — ANESTHESIA CARE TRANSFER NOTE
Last vitals:   Vitals:    05/17/18 1128   BP: 125/78   Pulse: 83   Resp: 16   Temp: 36.7  C (98  F)   SpO2: 96%     Patient's level of consciousness is awake  Spontaneous respirations: yes  Maintains airway independently: yes  Dentition unchanged: yes  Oropharynx: oropharynx clear of all foreign objects    QCDR Measures:  ASA# 20 - Surgical Safety Checklist: WHO surgical safety checklist completed prior to induction  PQRS# 430 - Adult PONV Prevention: 4558F - Pt received => 2 anti-emetic agents (different classes) preop & intraop  ASA# 8 - Peds PONV Prevention: NA - Not pediatric patient, not GA or 2 or more risk factors NOT present  PQRS# 424 - Carrie-op Temp Management: 4559F - At least one body temp DOCUMENTED => 35.5C or 95.9F within required timeframe  PQRS# 426 - PACU Transfer Protocol: - Transfer of care checklist used  ASA# 14 - Acute Post-op Pain: ASA14B - Patient did NOT experience pain >= 7 out of 10

## 2021-06-18 NOTE — ANESTHESIA POSTPROCEDURE EVALUATION
Patient: Chadd Henson  REMOVAL OF HARDWARE 5TH METATARSAL LEFT, EXOSTECTOMY 4, 5 TARSOMETATARSAL LEFT, CORRECTION HAMMER TOE 2ND, LEFT FOOT , TAILORS BUNION CORRECTION RIGHT FOOT  Anesthesia type: MAC    Patient location: PACU  Last vitals:   Vitals:    05/17/18 1130   BP: 122/69   Pulse: 88   Resp: 16   Temp:    SpO2: 96%     Post vital signs: stable  Level of consciousness: awake and responds to simple questions  Post-anesthesia pain: pain controlled  Post-anesthesia nausea and vomiting: no  Pulmonary: unassisted, return to baseline  Cardiovascular: stable and blood pressure at baseline  Hydration: adequate  Anesthetic events: no    QCDR Measures:  ASA# 11 - Carrie-op Cardiac Arrest: ASA11B - Patient did NOT experience unanticipated cardiac arrest  ASA# 12 - Carrie-op Mortality Rate: ASA12B - Patient did NOT die  ASA# 13 - PACU Re-Intubation Rate: NA - No ETT / LMA used for case  ASA# 10 - Composite Anes Safety: ASA10A - No serious adverse event    Additional Notes:

## 2021-06-18 NOTE — ANESTHESIA PREPROCEDURE EVALUATION
Anesthesia Evaluation      No history of anesthetic complications     Airway   Mallampati: III   Pulmonary - normal exam   (+) COPD mild, asthma  mild,                          Cardiovascular   (+) hypertension, CAD, ,     Rhythm: regular  Rate: normal,    murmur      Neuro/Psych    (+) neuromuscular disease,  CVA ,     Endo/Other    (+) diabetes mellitus well controlled, arthritis,      GI/Hepatic/Renal    (+) GERD well controlled,             Dental    (+) lower dentures and upper dentures                       Anesthesia Plan  Planned anesthetic: MAC    ASA 3   Induction: intravenous   Anesthetic plan and risks discussed with: patient and spouse

## 2021-06-25 NOTE — PROGRESS NOTES
Progress Notes by Ray Pugh MD at 9/1/2017  7:50 AM     Author: Ray Pugh MD Service: -- Author Type: Physician    Filed: 9/1/2017  8:31 AM Encounter Date: 9/1/2017 Status: Signed    : Ray Pugh MD (Physician)           Click to link to [facility] Clinic Note      Assessment:    1. Moderate aortic stenosis  Echo Complete    BNP(B-type Natriuretic Peptide)   2. Dyslipidemia, goal LDL below 100         Plan:    1.  Return to see Dr. Pugh in 6 months after the testing noted above.    An After Visit Summary was printed and given to the patient.    Subjective:    Chadd Henson returned for a planned 6 month follow up visit.    He continues to be short of breath with exertion.  He describes coughing up a clear but stringy sputum for 6 months.  He will discuss this with Dr. Ronald Saucedo.  Robinson does not experience angina pectoris, palpitations lightheadedness, or lower extremity edema.    He saw Dr. Ervin Young in consultation at the [facility] pulmonary clinic last March.  Dr. Young noted that his pulmonary function testing showed mild obstructive disease, at most.  His CT scans did not show any evidence of asbestosis or other chronic lung processes.    Past Medical History:    Patient Active Problem List   Diagnosis   ? Moderate aortic stenosis   ? Essential hypertension   ? Type 2 diabetes mellitus without complication   ? Dyslipidemia, goal LDL below 100       Past Medical History:   Diagnosis Date   ? Alcohol abuse 11/14/2007   ? Anemia    ? Aneurysm of thoracic aorta 2014    4.5 cm at [facility] in 2014, but 3.9 cm in ascending per MR read by Dr. Bucio in 2016   ? Atrial premature beats 4/17/2017   ? Bipolar affective disorder 2007    ECT times 6   ? Chest pain with normal coronary angiography 03/14/2005    per Dr. Hunter at [facility]   ? COPD (chronic obstructive pulmonary disease)     mild at most per Dr. Ervin Young; Robinson gets montelukast and several  inhalers from other MD's   ? DM II (diabetes mellitus, type II), controlled 2013   ? Dyslipidemia, goal LDL below 100    ? Essential hypertension    ? Gastroesophageal reflux disease 11/14/2007    Overview:  On ranitidine   ? History of transfusion    ? Moderate aortic stenosis 2/1/2017    mild to moderate by echo and MR   ? Nonocclusive coronary atherosclerosis of native coronary artery 01/11/2010    25% plaques per Dr. Verdugo at [facility] angio; Agatson score of 277 in 2017   ? MITZI (obstructive sleep apnea)     came off CPAP after 60 pound weight loss   ? Peripheral neuropathy    ? Rheumatoid arthritis     sees Dr. Johnson at [facility]; on methotrexate in 2017   ? Syndrome X, cardiac 2010    diagnosed by Dr. Chadd Heredia at [facility] and treated with isosorbide   ? TIA (transient ischemic attack) 10/22/2014    Another two episodes in Feb 2017 when he tried stopping ASA for three weeks. Per Dr. Ronald Saucedo, workup with Dr. Hsu of Neurology in 2014 did not find a cause. He recommended ASA and Dr. Saucedo told patient that he will need to accept the bleeding risk of ASA.       Past Surgical History:   Procedure Laterality Date   ? Billroth II  1957    for gastric ulcers   ? CARDIAC CATHETERIZATION  03/14/2005    normal coronary angiogram at [facility] per Dr. Pardeep Hunter   ? CARDIAC CATHETERIZATION  01/11/2010    nonocclusive coronary plaques per Dr. Verdugo (25%)   ? ELECTROCONVULSIVE THERAPY  2007   ? INGUINAL HERNIA REPAIR Left    ? LUMBAR DISCECTOMY      done 3 times   ? SKIN CANCER EXCISION  2009   ? TOTAL ANKLE ARTHROPLASTY Left 2015    by Dr. Oneal Montelongo, DPM, of [facility]   ? TRANSURETHRAL RESECTION OF PROSTATE     ? UMBILICAL HERNIA REPAIR      done 7 times; now okay        reports that he quit smoking about 55 years ago. His smoking use included Cigarettes. He has a 0.20 pack-year smoking history. He has never used smokeless tobacco. He reports that he does not  drink alcohol or use illicit drugs.    Family History   Problem Relation Age of Onset   ? Anesthesia problems Sister    ? CABG Sister 65   ? Valvular heart disease Sister    ? Coronary Stenting Sister    ? Pacemaker Sister    ? Diabetes type II Mother    ? Hypertension Mother    ? Morbid Obesity Mother    ? Stroke Mother 56     cerebral hemorrhage at autopsy   ? Aortic aneurysm Father 65     ruptured AAA,  two days after surgery in [place]   ? CABG Brother 70   ? Acute Myocardial Infarction Brother 78      in air evac helicopter between [facility]   ? Coronary Stenting Brother    ? Breast cancer Daughter    ? Lymphoma Son      non Hodgkins ( at [facility])   ? Cancer Brother    ? No Medical Problems Daughter        Outpatient Encounter Prescriptions as of 2017   Medication Sig Dispense Refill   ? albuterol (PROVENTIL HFA;VENTOLIN HFA) 90 mcg/actuation inhaler Inhale 2 puffs every 6 (six) hours as needed for wheezing.     ? albuterol (PROVENTIL) 2.5 mg /3 mL (0.083 %) nebulizer solution Take 2.5 mg by nebulization every 6 (six) hours as needed for wheezing.     ? aspirin 81 mg chewable tablet Chew 81 mg daily.     ? diltiazem (CARDIZEM CD) 240 MG 24 hr capsule Take 240 mg by mouth daily.     ? ETANERCEPT (ENBREL SUBQ) Inject under the skin once a week.     ? fluticasone-salmeterol (ADVAIR) 250-50 mcg/dose DISKUS Inhale 1 puff bedtime.     ? gabapentin (NEURONTIN) 300 MG capsule Take 300 mg by mouth every evening.      ? hydrOXYzine (VISTARIL) 50 MG capsule Take 1 capsule by mouth as needed.  2   ? isosorbide mononitrate (IMDUR) 60 MG 24 hr tablet Take 1 tablet by mouth daily.  3   ? leflunomide (ARAVA) 20 MG tablet Take 20 mg by mouth bedtime.     ? metFORMIN (GLUCOPHAGE) 500 MG tablet Take 500 mg by mouth. 3-AM, 2-PM.      ? mirtazapine (REMERON) 30 MG tablet Take 30 mg by mouth bedtime.     ? montelukast (SINGULAIR) 10 mg tablet Take 10 mg by mouth bedtime.     ? multivitamin  "therapeutic (THERAGRAN) tablet Take 1 tablet by mouth daily.     ? nitroglycerin (NITROSTAT) 0.4 MG SL tablet Place 0.4 mg under the tongue as needed.     ? omeprazole (PRILOSEC) 20 MG capsule 1 capsule daily.  2   ? methotrexate 2.5 MG tablet Take 6 tablets by mouth once a week.  1   ? methotrexate 25 mg/mL injection weekly  1   ? simvastatin (ZOCOR) 10 MG tablet Take 1 tablet by mouth at bedtime.  3   ? [DISCONTINUED] simvastatin (ZOCOR) 20 MG tablet Take 20 mg by mouth bedtime.       No facility-administered encounter medications on file as of 9/1/2017.        Review of Systems:     General: WNL  Eyes: WNL  Ears/Nose/Throat: WNL  Lungs: Cough, Shortness of Breath, Wheezing  Heart: Shortness of Breath with activity  Stomach: WNL  Bladder: WNL  Muscle/Joints: Joint Pain  Skin: WNL  Nervous System: WNL  Mental Health: WNL     Blood: WNL    Objective:     /64 (Patient Site: Right Arm, Patient Position: Sitting, Cuff Size: Adult Regular)  Pulse (!) 52  Resp 16  Ht 5' 9\" (1.753 m)  Wt 194 lb (88 kg)  BMI 28.65 kg/m2  Wt Readings from Last 5 Encounters:   09/01/17 194 lb (88 kg)   04/17/17 194 lb (88 kg)   04/06/17 189 lb (85.7 kg)   03/29/17 199 lb 9.6 oz (90.5 kg)   03/03/17 199 lb 9.6 oz (90.5 kg)        Physical Exam:    GENERAL APPEARANCE: alert, no apparent distress  EYES: no scleral icterus  NECK: no carotid bruits or adenopathy, jugular venous pressure is less than 5 cm  CHEST: symmetric, the lungs are clear to auscultation  CARDIOVASCULAR: regular rhythm with a grade 1/6 systolic ejection murmur  EXTREMITIES: no cyanosis, clubbing or edema  SKIN: no xanthelasma  NEUROLOGIC: normal gait and coordination  PSYCHIATRIC: mood and affect are normal    Cardiac Testing:    Echocardiogram of February 17. 2017:      Left ventricle ejection fraction is normal. The calculated left ventricular ejection fraction is 56%.    Moderate aortic valve stenosis with mild aortic insufficiency.    Mild tricuspid " regurgitation    Frequent ectopy noted throughout the study.    When compared to the previous study dated 3/3/2016, the aortic stenosis is now moderate.       Imaging:    No results found.    Lab Results:    Lab Results   Component Value Date    CREATININE 0.73 10/31/2016    BUN 8 10/31/2016     10/31/2016    K 4.4 10/31/2016     (H) 10/31/2016    CO2 20 (L) 10/31/2016     Lab Results   Component Value Date    CHOL 172 03/14/2017    TRIG 131 03/14/2017    HDL 38 (L) 03/14/2017     BNP (pg/mL)   Date Value   03/24/2017 41   02/06/2017 55     Creatinine (mg/dL)   Date Value   10/31/2016 0.73   09/19/2016 0.77   05/24/2016 0.73   05/09/2016 0.85   05/09/2016 0.84     LDL Calculated (mg/dL)   Date Value   03/14/2017 108   10/31/2016 93   05/09/2016 94     Lab Results   Component Value Date    WBC 6.2 09/19/2016    WBC 5.1 07/06/2015    HGB 12.7 (L) 03/29/2017    HCT 32.8 (L) 09/19/2016    MCV 79 (L) 09/19/2016     09/19/2016           Much or all of the text in this note was generated through the use of the Dragon Dictate voice-to-text software. Errors in spelling or words which seem out of context are unintentional. Sound alike errors, in particular, may have escaped editing.

## 2021-06-25 NOTE — PROGRESS NOTES
Progress Notes by Serafin Maza MD at 4/17/2017  8:30 AM     Author: Serafin Maza MD Service: -- Author Type: Physician    Filed: 4/17/2017 12:53 PM Encounter Date: 4/17/2017 Status: Signed    : Serafin Maza MD (Physician)           Click to link to [facility] Care     [facility] HEART CARE ELECTROPHYSIOLOGY CONSULTATION    Thank you, Dr. Ray Pugh and Dr. Saucedo, for asking the [facility] Heart Care team to see . Chadd Henson to evaluate very frequent atrial premature beats.      Assessment/Recommendations   Clinic Problem List:  1. Atrial premature beats     2. Moderate aortic stenosis     3. Essential hypertension         Assessment:    Very frequent atrial premature beats with atrial bigeminy.  Symptoms of chest pressure and fatigue may be related to atrial premature beats but this is uncertain I reassured him that his current chest discomfort not angina and that the atrial premature beats pose little risk.  Empiric propafenone was considered possible relief of symptoms but patient indicated his symptoms were tolerable as long as he realizes his pain probably does not represent angina.  Potential risk of atrial fibrillation but none has been documented.  He has a past history of TIAs but also a relative contraindication to anticoagulation with a history of anemia.   Follow-up with moderate aortic stenosis is indicated  Hypertension controlled.    Plan:  Okay to discontinue isosorbide  Call if increased symptoms of shortness of breath or chest pain with activity  Follow up appointment:   Dr. Pugh as scheduled or in 3 months, Dr. Maza if needed         History of Present Illness     Chadd Henson is a 76 y.o. male with very frequent atrial premature beats, chest discomfort atypical for ischemia, probably mild coronary artery disease and moderate aortic stenosis.  He was evaluated in February 2017 with palpitations consisting of irregular heart beating and some mild dyspnea on  exertion.  Cardiac echo showed ejection fraction of 56% and moderate aortic stenosis.  Cardiac MRI 2/23/2016 had shown ejection fraction of 55% and no evidence for myocardial scar.  Stress nuclear imaging 3/14/2017 showed no evidence for ischemia but estimated ejection fraction was 35%.  Frequent cardiac CT showed a calcium score of 200 6025-50% of that predicted by age but her nares angiography was impossible given irregular rhythm.. A Holter monitor on 3/30/2017 showed a moderate number of ventricular premature beats approximately 1200 over 24 hours but very frequent atrial premature beats 43,430 36% of all heartbeats.  The vast majority of this ectopy was in the form of atrial bigeminy.  Brief atrial tachycardia was seen but there was no atrial fibrillation.  He currently notes sense of chest heaviness which is constant and consistent with a sensation of a 5 pound weight over his anterior chest all the time.  Is no increase in chest pain or pressure with activity.  His activity is somewhat limited by arthritis with joint pain but he also describes some mild dyspnea with activity.  He denies symptoms of palpitations.  He describes some mild fatigue area there is no syncope or presyncope.  He does have a history of TIAs several occasions.  There is been no documented atrial fibrillation.  To my understanding that coronary angiography in 2010 showed no significant disease.    Personnaly reviewed: Tests described in paragraph 1, ECG February 2016 showed normal sinus rhythm and was otherwise normal.           Physical Examination Review of Systems   Vitals:    04/17/17 0824   BP: 120/72   Pulse: 92   Resp: 16     Body mass index is 28.65 kg/(m^2).  Wt Readings from Last 3 Encounters:   04/17/17 194 lb (88 kg)   04/06/17 189 lb (85.7 kg)   03/29/17 199 lb 9.6 oz (90.5 kg)        Appearance:   no distress, healthy-appearing older gentleman    HEENT: no scleral icterus, normal conjunctivae    Neck: no carotid bruits or  thyromegaly   Chest/Lungs:   lungs are clear to auscultation, no rales or wheezing,      Cardiovascular:   Jugular venous pressure 4 cm, Apical pulse is regular with multiple extrasystoles. Normal S1,S2 with a 2/6 systolic ejection murmur radiating to the right upper sternal border,   Abdomen:  no  Hepatosplenomegaly., nontender,  bowel sounds are present   Extremities: no cyanosis or clubbing, No edema   Skin: no xanthelasma, warm.    Neurologic: No gross focal neurologic deficits   Mood/Affect: Alert, cooperative    General: WNL  Eyes: WNL  Ears/Nose/Throat: WNL  Lungs: Cough, Shortness of Breath, Wheezing  Heart: Arm Pain, Shortness of Breath with activity, Irregular Heartbeat  Stomach: WNL  Bladder: WNL  Muscle/Joints: Joint Pain, Muscle Weakness  Skin: WNL  Nervous System: WNL  Mental Health: WNL     Blood: WNL     Medical History  Surgical History Family History Social History   Past Medical History:   Diagnosis Date   ? Alcohol abuse 11/14/2007   ? Anemia    ? Aneurysm of thoracic aorta 2014    4.5 cm at [facility] in 2014, but 3.9 cm in ascending per MR read by Dr. Bucio in 2016   ? Aortic stenosis, mild 2016    mild to moderate by echo and MR   ? Asthma    ? Bipolar affective disorder 2007    ECT times 6   ? Chest pain with normal coronary angiography 03/14/2005    per Dr. Hunter at [facility]   ? COPD (chronic obstructive pulmonary disease)    ? Coronary artery calcification seen on CT scan 4/6/2017    Agatson score of 277   ? DM II (diabetes mellitus, type II), controlled 2013   ? Dyslipidemia, goal LDL below 70 04/06/2017   ? Dyslipidemia, goal to be determined 11/14/2007   ? Essential hypertension    ? History of transfusion    ? Moderate aortic stenosis 2/1/2017    mild to moderate by echo and MR   ? Nonocclusive coronary atherosclerosis of native coronary artery 01/11/2010    25% plaques per Dr. Verdugo at [facility]   ? MITZI (obstructive sleep apnea)     came off CPAP after 60 pound weight loss   ?  Peripheral neuropathy    ? Rheumatoid arthritis     sees Dr. Patiño   ? Syndrome X, cardiac 2010    diagnosed by Dr. Chadd Heredia at [facility] and treated with isosorbide   ? Transient ischemic attack 10/22/2014   ? Type 2 diabetes mellitus without complication     Past Surgical History:   Procedure Laterality Date   ? Billroth II  1957    for gastric ulcers   ? CARDIAC CATHETERIZATION  03/14/2005    normal coronary angiogram at [facility] per Dr. Pardeep Hunter   ? CARDIAC CATHETERIZATION  01/11/2010    nonocclusive coronary plaques per Dr. Verdugo (25%)   ? ELECTROCONVULSIVE THERAPY  2007   ? INGUINAL HERNIA REPAIR Left    ? LUMBAR DISCECTOMY      done 3 times   ? SKIN CANCER EXCISION  2009   ? TOTAL ANKLE ARTHROPLASTY Left 2015    by Dr. Oneal Montelongo DPM, of [facility]   ? TRANSURETHRAL RESECTION OF PROSTATE     ? UMBILICAL HERNIA REPAIR N/A     done 7 times; now okay    Family History   Problem Relation Age of Onset   ? Anesthesia problems Sister    ? CABG Sister 65   ? Valvular heart disease Sister    ? Heart disease Sister    ? Coronary Stenting Sister    ? Pacemaker Sister    ? Diabetes type II Mother    ? Hypertension Mother    ? Morbid Obesity Mother    ? CABG Brother 70   ? Acute Myocardial Infarction Brother 78   ? Heart attack Brother    ? Coronary Stenting Brother    ? Breast cancer Daughter    ? Lymphoma Son      non Hodgkins   ? Cancer Brother       Social History     Social History   ? Marital status:      Spouse name: Dejan   ? Number of children: 3   ? Years of education: N/A     Occupational History   ? sales of auto parts Retired     [employer]     Social History Main Topics   ? Smoking status: Former Smoker     Packs/day: 0.05     Years: 4.00     Types: Cigarettes     Quit date: 2/23/1962   ? Smokeless tobacco: Never Used   ? Alcohol use No   ? Drug use: No   ? Sexual activity: Not on file     Other Topics Concern   ? Not on file     Social History Narrative     Four grandchildren          Medications  Allergies   Current Outpatient Prescriptions   Medication Sig Dispense Refill   ? albuterol (PROVENTIL HFA;VENTOLIN HFA) 90 mcg/actuation inhaler Inhale 2 puffs every 6 (six) hours as needed for wheezing.     ? albuterol (PROVENTIL) 2.5 mg /3 mL (0.083 %) nebulizer solution Take 2.5 mg by nebulization every 6 (six) hours as needed for wheezing.     ? aspirin 81 mg chewable tablet Chew 81 mg daily.     ? diltiazem (CARDIZEM CD) 240 MG 24 hr capsule Take 240 mg by mouth daily.     ? fluticasone-salmeterol (ADVAIR) 250-50 mcg/dose DISKUS Inhale 1 puff bedtime.     ? gabapentin (NEURONTIN) 300 MG capsule Take 300 mg by mouth every evening.      ? hydrOXYzine (VISTARIL) 50 MG capsule Take 1 capsule by mouth as needed.  2   ? leflunomide (ARAVA) 20 MG tablet Take 20 mg by mouth bedtime.     ? metFORMIN (GLUCOPHAGE) 500 MG tablet Take 500 mg by mouth. 3-AM, 2-PM.      ? methotrexate 2.5 MG tablet Take 6 tablets by mouth once a week.  1   ? mirtazapine (REMERON) 30 MG tablet Take 30 mg by mouth bedtime.     ? montelukast (SINGULAIR) 10 mg tablet Take 10 mg by mouth bedtime.     ? multivitamin therapeutic (THERAGRAN) tablet Take 1 tablet by mouth daily.     ? nitroglycerin (NITROSTAT) 0.4 MG SL tablet Place 0.4 mg under the tongue as needed.     ? omeprazole (PRILOSEC) 20 MG capsule 1 capsule daily.  2   ? simvastatin (ZOCOR) 10 MG tablet Take 1 tablet by mouth at bedtime.  3   ? simvastatin (ZOCOR) 20 MG tablet Take 20 mg by mouth bedtime.     ? methotrexate 25 mg/mL injection weekly  1     No current facility-administered medications for this visit.       Allergies   Allergen Reactions   ? Codeine Other (See Comments)     Kidney and liver shuts down and sharp stomach pain     ? Crestor [Rosuvastatin]      Upset stomach   ? Morphine Other (See Comments)     Kidneys and liver shut down   ? Vicodin [Hydrocodone-Acetaminophen] Other (See Comments)     Sharp stomach pains

## 2021-06-25 NOTE — PROGRESS NOTES
"Progress Notes by Ray Pugh MD at 2/6/2017  8:10 AM     Author: Ray Pugh MD Service: -- Author Type: Physician    Filed: 2/6/2017  8:57 AM Encounter Date: 2/6/2017 Status: Signed    : Ray Pugh MD (Physician)           Click to link to [facility]     [facility] Clinic Note      Assessment:    1. Aortic stenosis, mild  Echo Complete    BNP(B-type Natriuretic Peptide)   2. Palpitations  LUIZ Hook-Up       Plan:    1.  We will call Robinson with the tests ordered above.  If these tests are unremarkable, I suggest that he discuss more in-depth pulmonary function testing with Dr. Saucedo as he states physicians at [facility] stated he had asbestos injury to his lungs.  2.  We will plan to meet again in 1 year after a new MRA of the thoracic aorta.    An After Visit Summary was printed and given to the patient.    Subjective:    Chadd Henson returned for a planned annual follow up visit.  His wife, Dejan, accompanied him to the visit.    He reports that all of his physician tell him he has an irregular heartbeat.  He checks his blood pressure once or twice a week and believes his pulse \"fluctuates\".  He does not describe a physical awareness of these irregularities.  Robinson states he is becoming more and more short of breath with exertion.  He wheezes when he walks up the stairs.    His hemoglobin fell to just over 10 again in September.  He states he is unable to absorb iron because of a Billroth II operation as a teenager.  He receives iron infusions.  His coronary angiogram showed no plaquing, but he is still on aspirin therapy.  I recommended that he discuss the pros and cons of chronic aspirin therapy with Dr. Saucedo MI: If it is contributing to chronic blood loss and iron deficiency, the benefits may not outweigh this difficulty.    Past Medical History:    Patient Active Problem List   Diagnosis   ? Aortic stenosis, mild   ? Aneurysm of thoracic aorta   ? " Asthma   ? Depression   ? Gastroesophageal reflux disease   ? Essential hypertension   ? Dyslipidemia, goal to be determined   ? Obstructive sleep apnea syndrome   ? Type 2 diabetes mellitus without complication       Past Medical History:   Diagnosis Date   ? Alcohol abuse 11/14/2007   ? Anemia    ? Aneurysm of thoracic aorta 2014    4.5 cm at [facility] in 2014, but 3.9 cm in ascending per MR read by Dr. Bucio in 2016   ? Aortic stenosis, mild 2016    mild to moderate by echo and MR   ? Asthma    ? Bipolar affective disorder 2007    ECT times 6   ? DM II (diabetes mellitus, type II), controlled 2013   ? Dyslipidemia, goal to be determined 11/14/2007   ? Essential hypertension    ? History of transfusion    ? MITZI (obstructive sleep apnea)     came off CPAP after 60 pound weight loss   ? Peripheral neuropathy    ? Rheumatoid arthritis     sees Dr. Patiño   ? Syndrome X, cardiac 2010    diagnosed by Dr. Chadd Heredia at [facility] and treated with isosorbide   ? Transient ischemic attack 10/22/2014   ? Type 2 diabetes mellitus without complication        Past Surgical History:   Procedure Laterality Date   ? Billroth II  1957    for gastric ulcers   ? CARDIAC CATHETERIZATION  2005    normal coronary angiogram at [facility] per Dr. Pardeep Hunter   ? ELECTROCONVULSIVE THERAPY  2007   ? INGUINAL HERNIA REPAIR Left    ? LUMBAR DISCECTOMY      done 3 times   ? SKIN CANCER EXCISION  2009   ? TOTAL ANKLE ARTHROPLASTY Left 2015    by Dr. Oneal Montelongo, DPM, of [facility]   ? TRANSURETHRAL RESECTION OF PROSTATE     ? UMBILICAL HERNIA REPAIR N/A     done 7 times; now okay        reports that he quit smoking about 54 years ago. His smoking use included Cigarettes. He has a 0.20 pack-year smoking history. He has never used smokeless tobacco. He reports that he does not drink alcohol or use illicit drugs.    Family History   Problem Relation Age of Onset   ? Anesthesia problems Sister    ? CABG Sister 65   ? Diabetes type II  Mother    ? Hypertension Mother    ? Morbid Obesity Mother    ? CABG Brother 70   ? Acute Myocardial Infarction Brother 78   ? Breast cancer Daughter    ? Lymphoma Son      non Hodgkins   ? Cancer Brother        Outpatient Encounter Prescriptions as of 2/6/2017   Medication Sig Dispense Refill   ? albuterol (PROVENTIL HFA;VENTOLIN HFA) 90 mcg/actuation inhaler Inhale 2 puffs every 6 (six) hours as needed for wheezing.     ? albuterol (PROVENTIL) 2.5 mg /3 mL (0.083 %) nebulizer solution Take 2.5 mg by nebulization every 6 (six) hours as needed for wheezing.     ? aspirin 81 mg chewable tablet Chew 81 mg daily.     ? diltiazem (CARDIZEM CD) 240 MG 24 hr capsule Take 240 mg by mouth daily.     ? fluticasone-salmeterol (ADVAIR) 250-50 mcg/dose DISKUS Inhale 1 puff bedtime.     ? gabapentin (NEURONTIN) 300 MG capsule Take 300 mg by mouth every evening.      ? hydroxychloroquine (PLAQUENIL) 200 mg tablet Take 200 mg by mouth daily.     ? isosorbide mononitrate (IMDUR) 60 MG 24 hr tablet Take 60 mg by mouth daily.     ? leflunomide (ARAVA) 20 MG tablet Take 20 mg by mouth bedtime.     ? metFORMIN (GLUCOPHAGE) 500 MG tablet Take 500 mg by mouth. 3-AM, 2-PM.      ? methotrexate 25 mg/mL injection weekly  1   ? mirtazapine (REMERON) 30 MG tablet Take 30 mg by mouth bedtime.     ? montelukast (SINGULAIR) 10 mg tablet Take 10 mg by mouth bedtime.     ? multivitamin therapeutic (THERAGRAN) tablet Take 1 tablet by mouth daily.     ? nitroglycerin (NITROSTAT) 0.4 MG SL tablet Place 1 tablet (0.4 mg total) under the tongue every 5 (five) minutes as needed for chest pain. 90 tablet 1   ? simvastatin (ZOCOR) 20 MG tablet Take 20 mg by mouth bedtime.       No facility-administered encounter medications on file as of 2/6/2017.        Review of Systems:     General: WNL  Eyes: WNL  Ears/Nose/Throat: WNL  Lungs: Cough, Shortness of Breath, Wheezing  Heart: Chest Pain, Irregular Heartbeat  Stomach: Diarrhea, Heartburn  Bladder: Frequent  "Urination at Night  Muscle/Joints: Joint Pain, Muscle Weakness, Muscle Pain  Skin: WNL  Nervous System: Daytime Sleepiness  Mental Health: Confusion     Blood: Easy Bruising    Objective:     Visit Vitals   ? /68 (Patient Site: Right Arm, Patient Position: Sitting, Cuff Size: Adult Regular)   ? Pulse 60   ? Resp 16   ? Ht 5' 9\" (1.753 m)   ? Wt 196 lb (88.9 kg)   ? BMI 28.94 kg/m2     Wt Readings from Last 5 Encounters:   02/06/17 196 lb (88.9 kg)   02/23/16 203 lb 3.2 oz (92.2 kg)   09/28/15 190 lb (86.2 kg)        Physical Exam:    GENERAL APPEARANCE: alert, no apparent distress  EYES: no scleral icterus  NECK: no carotid bruits or adenopathy, jugular venous pressure is less than 5 cm  CHEST: symmetric, the lungs are clear to auscultation  CARDIOVASCULAR: regular rhythm with a grade 2/6 early peaking systolic ejection murmur  EXTREMITIES: no cyanosis, clubbing or edema  SKIN: no xanthelasma  NEUROLOGIC: normal gait and coordination  PSYCHIATRIC: mood and affect are normal    Cardiac Testing:    Echocardiogram: pending    Imaging:    No results found.    Lab Results:    Lab Results   Component Value Date    CREATININE 0.73 10/31/2016    BUN 8 10/31/2016     10/31/2016    K 4.4 10/31/2016     (H) 10/31/2016    CO2 20 (L) 10/31/2016     Lab Results   Component Value Date    CHOL 153 10/31/2016    TRIG 86 10/31/2016    HDL 43 10/31/2016     CREATININE (mg/dL)   Date Value   10/31/2016 0.73   09/19/2016 0.77   05/24/2016 0.73   05/09/2016 0.85   05/09/2016 0.84     MAGNESIUM (mg/dL)   Date Value   06/07/2009 1.6 (L)     LDL CALCULATED (mg/dL)   Date Value   10/31/2016 93   05/09/2016 94   02/17/2015 102     Lab Results   Component Value Date    WBC 6.2 09/19/2016    WBC 5.1 07/06/2015    HGB 10.7 (L) 09/19/2016    HCT 32.8 (L) 09/19/2016    MCV 79 (L) 09/19/2016     09/19/2016           Much or all of the text in this note was generated through the use of the Dragon Dictate voice-to-text " software. Errors in spelling or words which seem out of context are unintentional. Sound alike errors, in particular, may have escaped editing.

## 2021-06-25 NOTE — PROGRESS NOTES
Progress Notes by Ray Pugh MD at 3/29/2017  8:50 AM     Author: Ray Pugh MD Service: -- Author Type: Physician    Filed: 3/29/2017  9:44 AM Encounter Date: 3/29/2017 Status: Signed    : Ray Pugh MD (Physician)           Click to link to [facility] [place]note 2[facility] Note    Chadd Henson and his wife came to meet with me today at the [facility] to evaluate shortness of breath with abnormal nuclear stress test results.     Assessment:    1. Cardiomyopathy  Holter Monitor    CTA Coronary W Calcium Score    Thyroid Stimulating Hormone (TSH)    Electrophoresis, Protein, Serum   2. PAC (premature atrial contraction)     3. Moderate aortic stenosis     4. URIBE (dyspnea on exertion)  Hemoglobin   5. Fatigue  Hemoglobin       Plan:    1.  CT coronary angiogram to exclude inducible ischemia as a cause of apparent decline in left ventricular systolic performance.  However, I suspect that the gated ejection fraction is falsely low due to frequent premature beats as his echocardiogram showed normal systolic performance and his BNP level is normal measured both in February and again 1 week ago.  2.  Holter monitor to reassess for bradycardia; Robinson states he was told yesterday at the hospital that his heart rate was 48.  3.  Laboratory testing as noted above vis-à-vis his fatigue, dyspnea, and possible cardiomyopathy    If there is no evidence of obstructive coronary disease, I will recommend electrophysiology consultation with regard to his pulse irregularity, fatigue, and perceived bradycardia.  I do not think his moderate aortic stenosis is sufficient to cause this degree of shortness of breath, especially with a normal BNP level.    An After Visit Summary was printed and given to the patient.    Current History:    Robinson continues to be short of breath with modest exertion.  He has left-sided chest pain which is chronic.  It  is not anginal in character.  He describes intermittent nausea, frequent urination, joint pain and feeling blue.  He was at the hospital yesterday as his grandson was having a tonsillectomy.  He felt unwell states the nurse checked his blood pressure.  It was 110/54.  He was told his pulse was 48.  He states he frequently has pulse rates in the 40s when he checks his blood pressure with his automated unit at home.  We discussed how automated units may be erroneous on the pulse rate when there are frequent premature beats.    Robinson tried coming off aspirin and had another transient ischemic attack.  He and his wife do not know why he has these attacks.    Patient Active Problem List   Diagnosis   ? Moderate aortic stenosis   ? Aneurysm of thoracic aorta   ? Asthma   ? Depression   ? Gastroesophageal reflux disease   ? Essential hypertension   ? Dyslipidemia, goal to be determined   ? Obstructive sleep apnea syndrome   ? TIA (transient ischemic attack)   ? Type 2 diabetes mellitus without complication       Past Medical History:  Past Medical History:   Diagnosis Date   ? Alcohol abuse 11/14/2007   ? Anemia    ? Aneurysm of thoracic aorta 2014    4.5 cm at [facility] in 2014, but 3.9 cm in ascending per MR read by Dr. Bucio in 2016   ? Aortic stenosis, mild 2016    mild to moderate by echo and MR   ? Asthma    ? Bipolar affective disorder 2007    ECT times 6   ? Chest pain with normal coronary angiography 03/14/2005    per Dr. Hunter at [facility]   ? COPD (chronic obstructive pulmonary disease)    ? DM II (diabetes mellitus, type II), controlled 2013   ? Dyslipidemia, goal to be determined 11/14/2007   ? Essential hypertension    ? History of transfusion    ? Moderate aortic stenosis 2/1/2017    mild to moderate by echo and MR   ? Nonocclusive coronary atherosclerosis of native coronary artery 01/11/2010    25% plaques per Dr. Verdugo [facility]   ? MITZI (obstructive sleep apnea)     came off CPAP after 60 pound  weight loss   ? Peripheral neuropathy    ? Rheumatoid arthritis     sees Dr. Patiño   ? Syndrome X, cardiac 2010    diagnosed by Dr. Chadd [facility]redia at [facility] and treated with isosorbide   ? Transient ischemic attack 10/22/2014   ? Type 2 diabetes mellitus without complication        Past Surgical History:  Past Surgical History:   Procedure Laterality Date   ? Billroth II  1957    for gastric ulcers   ? CARDIAC CATHETERIZATION  03/14/2005    normal coronary angiogram at [facility] per Dr. Pardeep Hunter   ? CARDIAC CATHETERIZATION  01/11/2010    nonocclusive coronary plaques per Dr. Verdugo (25%)   ? ELECTROCONVULSIVE THERAPY  2007   ? INGUINAL HERNIA REPAIR Left    ? LUMBAR DISCECTOMY      done 3 times   ? SKIN CANCER EXCISION  2009   ? TOTAL ANKLE ARTHROPLASTY Left 2015    by Dr. Oneal Montelongo, DPCASSANDRA, of [facility]   ? TRANSURETHRAL RESECTION OF PROSTATE     ? UMBILICAL HERNIA REPAIR N/A     done 7 times; now okay       Family History:  Family History   Problem Relation Age of Onset   ? Anesthesia problems Sister    ? CABG Sister 65   ? Valvular heart disease Sister    ? Heart disease Sister    ? Coronary Stenting Sister    ? Pacemaker Sister    ? Diabetes type II Mother    ? Hypertension Mother    ? Morbid Obesity Mother    ? CABG Brother 70   ? Acute Myocardial Infarction Brother 78   ? Heart attack Brother    ? Coronary Stenting Brother    ? Breast cancer Daughter    ? Lymphoma Son      non Hodgkins   ? Cancer Brother        Social History:   reports that he quit smoking about 55 years ago. His smoking use included Cigarettes. He has a 0.20 pack-year smoking history. He has never used smokeless tobacco. He reports that he does not drink alcohol or use illicit drugs.    Medications:  Outpatient Encounter Prescriptions as of 3/29/2017   Medication Sig Dispense Refill   ? albuterol (PROVENTIL HFA;VENTOLIN HFA) 90 mcg/actuation inhaler Inhale 2 puffs every 6 (six) hours as needed for wheezing.     ?  "albuterol (PROVENTIL) 2.5 mg /3 mL (0.083 %) nebulizer solution Take 2.5 mg by nebulization every 6 (six) hours as needed for wheezing.     ? aspirin 81 mg chewable tablet Chew 81 mg daily.     ? diltiazem (CARDIZEM CD) 240 MG 24 hr capsule Take 240 mg by mouth daily.     ? fluticasone-salmeterol (ADVAIR) 250-50 mcg/dose DISKUS Inhale 1 puff bedtime.     ? gabapentin (NEURONTIN) 300 MG capsule Take 300 mg by mouth every evening.      ? isosorbide mononitrate (IMDUR) 60 MG 24 hr tablet Take 60 mg by mouth daily.     ? leflunomide (ARAVA) 20 MG tablet Take 20 mg by mouth bedtime.     ? metFORMIN (GLUCOPHAGE) 500 MG tablet Take 500 mg by mouth. 3-AM, 2-PM.      ? methotrexate 25 mg/mL injection weekly  1   ? mirtazapine (REMERON) 30 MG tablet Take 30 mg by mouth bedtime.     ? montelukast (SINGULAIR) 10 mg tablet Take 10 mg by mouth bedtime.     ? multivitamin therapeutic (THERAGRAN) tablet Take 1 tablet by mouth daily.     ? omeprazole (PRILOSEC) 20 MG capsule 1 capsule daily.  2   ? simvastatin (ZOCOR) 20 MG tablet Take 20 mg by mouth bedtime.       No facility-administered encounter medications on file as of 3/29/2017.        Allergies  Codeine; Crestor [rosuvastatin]; Morphine; and Vicodin [hydrocodone-acetaminophen]    Review of Systems    General: WNL  Eyes: WNL  Ears/Nose/Throat: WNL  Lungs: Shortness of Breath  Heart: Shortness of Breath with activity, Irregular Heartbeat  Stomach: Nausea  Bladder: Frequent Urination at Night  Muscle/Joints: Joint Pain  Skin: WNL  Nervous System: Daytime Sleepiness  Mental Health: Depression     Blood: WNL    Objective:    Wt Readings from Last 5 Encounters:   03/29/17 199 lb 9.6 oz (90.5 kg)   03/03/17 199 lb 9.6 oz (90.5 kg)   02/17/17 196 lb (88.9 kg)   02/06/17 196 lb (88.9 kg)   02/23/16 203 lb 3.2 oz (92.2 kg)      5' 9\" (1.753 m)  Body mass index is 29.48 kg/(m^2).  /62 (Patient Site: Right Arm, Patient Position: Sitting, Cuff Size: Adult Regular)  Pulse 72 " "Comment: irregular  Resp 16  Ht 5' 9\" (1.753 m)  Wt 199 lb 9.6 oz (90.5 kg)  BMI 29.48 kg/m2     Physical Exam:    General Appearance: Alert and not in distress   HEENT: No scleral icterus; the mucous membranes are pink and moist   Neck: No cervical bruits, adenopathy, or thyromegaly; jugular venous pressure is less than 5 cm    Chest: The chest is symmetric   Lungs: Respirations are unlabored; the lungs are clear to auscultation   Cardiovasular: Auscultation reveals normal first and second heart sounds with a grade 2/6 systolic ejection murmur    Extremities: No cyanosis, clubbing or edema   Skin: No xanthelasma   Neurologic: Normal gait and coordination   Psychiatric: Mood and affect are normal       Cardiac testing:    Echocardiogram in February suggested normal left ventricular systolic performance       Results for orders placed during the hospital encounter of 03/14/17   NM Pharmacologic Stress Test [VOR613] 03/14/2017    Narrative   2.The left ventricular ejection fraction is 33%. This is severely   diminished.    1.The pharmacologic nuclear stress test is negative for inducible   myocardial ischemia or infarction. Small inferoapical defect is most   likely diaphragmatic attenuation.    3.When compared to the images of 1/8/2010, there has been changes noted   when compared to report of prior study. The medium sized area of inferior   ischemia is not seen, and ejection fraction is now 33%, decreased from   56%.        Imaging:    Nm Pharmacologic Stress Test    Result Date: 3/14/2017    2.The left ventricular ejection fraction is 33%. This is severely diminished.   1.The pharmacologic nuclear stress test is negative for inducible myocardial ischemia or infarction. Small inferoapical defect is most likely diaphragmatic attenuation.   3.When compared to the images of 1/8/2010, there has been changes noted when compared to report of prior study. The medium sized area of inferior ischemia is not seen, and " ejection fraction is now 33%, decreased from 56%.        Lab Review:    Lab Results   Component Value Date     10/31/2016    K 4.4 10/31/2016     (H) 10/31/2016    CO2 20 (L) 10/31/2016    BUN 8 10/31/2016    CREATININE 0.73 10/31/2016    CALCIUM 9.1 10/31/2016     Lab Results   Component Value Date    WBC 6.2 09/19/2016    WBC 5.1 07/06/2015    HGB 10.7 (L) 09/19/2016    HCT 32.8 (L) 09/19/2016    MCV 79 (L) 09/19/2016     09/19/2016     Lab Results   Component Value Date    CHOL 172 03/14/2017    TRIG 131 03/14/2017    HDL 38 (L) 03/14/2017     LDL Calculated (mg/dL)   Date Value   03/14/2017 108   10/31/2016 93   05/09/2016 94     BNP (pg/mL)   Date Value   03/24/2017 41   02/06/2017 55           Much or all of the text in this note was generated through the use of the Dragon Dictate voice-to-text software. Errors in spelling or words which seem out of context are unintentional. Sound alike errors, in particular, may have escaped editing.

## 2021-06-26 NOTE — PROGRESS NOTES
Progress Notes by Madeleine Zurita CNP at 5/2/2018  8:30 AM     Author: Madeleine Zurita CNP Service: -- Author Type: Nurse Practitioner    Filed: 5/2/2018  9:27 AM Encounter Date: 5/2/2018 Status: Signed    : Madeleine Zurita CNP (Nurse Practitioner)           Click to link to [facility] Heart [facility] HEART CARE NOTE      Assessment/Recommendations   Assessment:    1. Cardiomyopathy with systolic dysfunction, NYHA class II: No signs of fluid retention.  He continues to have mild dyspnea on exertion and fatigue.  His weights have remained stable.  He continues to follow a low-sodium diet.    2.  Bradycardia: On EKG today he has PACs and his ventricle rate is 81 bpm.      Plan:  1.   Heart failure medications:  - Beta blocker therapy with metroprolol succinate 25 mg daily  - Diuretic therapy with furosemide 20 mg daily  2.  Continue current medications  3.  Continue daily weights and low-sodium diet    Chadd Henson will follow up with [facility] May 21 and in the heart failure clinic in 6 weeks.     History of Present Illness    Mr. Chadd Henson is a 77 y.o. male seen at [facility] heart failure clinic today for continued follow-up.  He follows up for cardiomyopathy with systolic dysfunction.  His most recent echocardiogram was done February 14, 2018 which showed an ejection fraction of 40% along with moderate aortic stenosis.  His past medical history is significant for hypertension, hyperlipidemia, COPD, diabetes, and TIA.  He has a history of premature atrial contractions.    During the last clinic visit, I discontinued his diltiazem.  He states he tolerated this well.  He continues to have mild dyspnea on exertion.  He states his dizziness has improved.  He denies any orthopnea or PND.  He denies lightheadedness, shortness of breath, orthopnea, PND, palpitations, chest pain, abdominal fullness/bloating and lower extremity edema.      He is monitoring home weights which  are stable between 186-188 pounds.  He is following a low sodium diet.      ECHO 2/14/2018:   Summary     Left Ventricle: Normal left ventricular size.The estimated left ventricular ejection fraction is 40%. This represents a moderately decreased ejection fraction. Mild concentric hypertrophy noted.    Right Ventricle: Normal right ventricular size and systolic function.    Left Atrium: Left atrial volume is moderately increased.    Aortic Valve: Moderate calcific aortic stenosis (peak bailey: 3.1 m/sec, mean gradient: 26 mm Hg, JAMSHID: 1.2 cm2, SVi:41.5ml/m2, DI:0.3) . Mild aortic regurgitation.    Mild tricuspid valve regurgitation. No pulmonary hypertension present. The estimated systolic pulmonary artery pressure is 24 mmhg.    When compared to the previous study dated 2/17/2017, interval decline in left ventricular systolic function. No signficant change in aortic valve stenosis.          Physical Examination Review of Systems   Vitals:    05/02/18 0818   BP: 112/60   Pulse: (!) 44   Resp: 12     Body mass index is 28.77 kg/(m^2).  Wt Readings from Last 3 Encounters:   05/02/18 192 lb (87.1 kg)   04/24/18 191 lb 9.6 oz (86.9 kg)   04/12/18 193 lb (87.5 kg)       General Appearance:     Alert, cooperative and in no acute distress.   ENT/Mouth: membranes moist, no oral lesions or bleeding gums.      EYES:  no scleral icterus, normal conjunctivae   Neck: no carotid bruits or thyromegaly   Chest/Lungs:   lungs are clear to auscultation, no rales or wheezing, respirations unlabored   Cardiovascular:   Regular. Normal first and second heart sounds with no murmurs, rubs, or gallops; the carotid, radial and posterior tibial pulses are intact, Jugular venous pressure normal, no edema    Abdomen:  Soft, nontender, nondistended, bowel sounds present   Extremities: no cyanosis or clubbing   Skin: warm, dry.    Neurologic: mood and affect are appropriate, alert and oriented x3      General: WNL  Eyes: WNL  Ears/Nose/Throat:  WNL  Lungs: WNL  Heart: WNL  Stomach: WNL  Bladder: WNL  Muscle/Joints: WNL  Skin: Poor Wound Healing  Nervous System: WNL  Mental Health: WNL           Medical History  Surgical History Family History Social History   Past Medical History:   Diagnosis Date   ? Alcohol abuse 11/14/2007   ? Anemia    ? Aneurysm of thoracic aorta 2014    4.5 cm at [facility] in 2014, but 3.9 cm in ascending per MR read by Dr. Bucio in 2016   ? Atrial premature beats 4/17/2017   ? Bipolar affective disorder 2007    ECT times 6   ? Cardiomyopathy 3/14/2017   ? Chest pain with normal coronary angiography 03/14/2005    per Dr. Hunter at [facility]   ? COPD (chronic obstructive pulmonary disease)     mild at most per Dr. Ervin Young; Robinson gets montelukast and several inhalers from other MD's   ? DM II (diabetes mellitus, type II), controlled 2013   ? Dyslipidemia, goal LDL below 100    ? Essential hypertension    ? Gastroesophageal reflux disease 11/14/2007    Overview:  On ranitidine   ? History of transfusion    ? Moderate aortic stenosis 2/1/2017    mild to moderate by echo and MR   ? Nonocclusive coronary atherosclerosis of native coronary artery 01/11/2010    25% plaques per Dr. Verdugo at [facility] angio; Agatson score of 277 in 2017   ? MITZI (obstructive sleep apnea)     came off CPAP after 60 pound weight loss   ? Peripheral neuropathy    ? Rheumatoid arthritis     sees Dr. Johnson at [facility]; on methotrexate in 2017   ? Syndrome X, cardiac 2010    diagnosed by Dr. Chadd Heredia at [facility] and treated with isosorbide   ? TIA (transient ischemic attack) 10/22/2014    Another two episodes in Feb 2017 when he tried stopping ASA for three weeks. Per Dr. Ronald Saucedo, workup with Dr. Hsu of Neurology in 2014 did not find a cause. He recommended ASA and Dr. Saucedo told patient that he will need to accept the bleeding risk of ASA.    Past Surgical History:   Procedure Laterality Date   ? Billroth II  1957    for gastric  ulcers   ? CARDIAC CATHETERIZATION  2005    normal coronary angiogram at [facility] per Dr. Pardeep Hunter   ? CARDIAC CATHETERIZATION  2010    nonocclusive coronary plaques per Dr. Verdugo (25%)   ? ELECTROCONVULSIVE THERAPY     ? INGUINAL HERNIA REPAIR Left    ? LUMBAR DISCECTOMY      done 3 times   ? SKIN CANCER EXCISION     ? TOTAL ANKLE ARTHROPLASTY Left 2015    by Dr. Oneal Montelongo, AMIRA, of [facility]   ? TRANSURETHRAL RESECTION OF PROSTATE     ? UMBILICAL HERNIA REPAIR      done 7 times; now okay    Family History   Problem Relation Age of Onset   ? Anesthesia problems Sister    ? CABG Sister 65   ? Valvular heart disease Sister    ? Coronary Stenting Sister    ? Pacemaker Sister    ? Diabetes type II Mother    ? Hypertension Mother    ? Morbid Obesity Mother    ? Stroke Mother 56     cerebral hemorrhage at autopsy   ? Aortic aneurysm Father 65     ruptured AAA,  two days after surgery in [place]   ? CABG Brother 70   ? Acute Myocardial Infarction Brother 78      in air evac helicopter [facility]   ? Coronary Stenting Brother    ? Breast cancer Daughter    ? Lymphoma Son      non Hodgkins ( at [facility])   ? Cancer Brother    ? No Medical Problems Daughter     Social History     Social History   ? Marital status:      Spouse name: Dejan   ? Number of children: 3   ? Years of education: N/A     Occupational History   ? sales of auto parts Retired     [employer]     Social History Main Topics   ? Smoking status: Former Smoker     Packs/day: 0.05     Years: 4.00     Types: Cigarettes     Quit date: 1962   ? Smokeless tobacco: Never Used   ? Alcohol use No   ? Drug use: No   ? Sexual activity: Not on file     Other Topics Concern   ? Not on file     Social History Narrative    Four grandchildren          Medications  Allergies   Current Outpatient Prescriptions   Medication Sig Dispense Refill   ? albuterol (PROVENTIL  HFA;VENTOLIN HFA) 90 mcg/actuation inhaler Inhale 2 puffs every 6 (six) hours as needed for wheezing.     ? albuterol (PROVENTIL) 2.5 mg /3 mL (0.083 %) nebulizer solution Take 2.5 mg by nebulization every 6 (six) hours as needed for wheezing.     ? aspirin 81 mg chewable tablet Chew 81 mg daily.     ? ETANERCEPT (ENBREL SUBQ) Inject under the skin once a week.     ? fluticasone-salmeterol (ADVAIR) 250-50 mcg/dose DISKUS Inhale 1 puff bedtime.     ? furosemide (LASIX) 20 MG tablet Take 1 tablet (20 mg total) by mouth daily. 90 tablet 3   ? gabapentin (NEURONTIN) 300 MG capsule Take 300 mg by mouth every evening.      ? hydrOXYzine (VISTARIL) 50 MG capsule Take 1 capsule by mouth as needed.  2   ? isosorbide mononitrate (IMDUR) 60 MG 24 hr tablet Take 1 tablet by mouth daily.  3   ? metFORMIN (GLUCOPHAGE) 500 MG tablet Take 500 mg by mouth. 3-AM, 2-PM.      ? metoprolol succinate (TOPROL-XL) 25 MG Take 1 tablet (25 mg total) by mouth daily. 90 tablet 3   ? montelukast (SINGULAIR) 10 mg tablet Take 10 mg by mouth bedtime.     ? multivitamin therapeutic (THERAGRAN) tablet Take 1 tablet by mouth daily.     ? nitroglycerin (NITROSTAT) 0.4 MG SL tablet Place 0.4 mg under the tongue as needed.     ? omeprazole (PRILOSEC) 20 MG capsule Take 1 capsule by mouth daily.   2   ? simvastatin (ZOCOR) 10 MG tablet Take 1 tablet by mouth at bedtime.  3   ? doxycycline (VIBRAMYCIN) 100 MG capsule Take 100 mg by mouth 2 (two) times a day.  0   ? methotrexate 2.5 MG tablet Take 6 tablets by mouth once a week.  1   ? methotrexate 25 mg/mL injection weekly  1   ? mirtazapine (REMERON) 30 MG tablet Take 30 mg by mouth bedtime.     ? predniSONE (DELTASONE) 10 mg tablet Take 10 mg by mouth 2 (two) times a day.  0     No current facility-administered medications for this visit.       Allergies   Allergen Reactions   ? Codeine Other (See Comments)     Kidney and liver shuts down and sharp stomach pain     ? Crestor [Rosuvastatin]      Upset  stomach   ? Morphine Other (See Comments)     Kidneys and liver shut down   ? Vicodin [Hydrocodone-Acetaminophen] Other (See Comments)     Sharp stomach pains   ? Losartan Angiodema         Lab Results    Chemistry CBC BNP   Lab Results   Component Value Date    CREATININE 0.79 03/19/2018    BUN 6 (L) 03/19/2018     03/19/2018    K 4.3 03/19/2018     03/19/2018    CO2 28 03/19/2018     Creatinine (mg/dL)   Date Value   03/19/2018 0.79   03/08/2018 0.85   10/31/2016 0.73   09/19/2016 0.77    Lab Results   Component Value Date    WBC 6.2 09/19/2016    HGB 12.7 (L) 03/29/2017    HCT 32.8 (L) 09/19/2016    MCV 79 (L) 09/19/2016     09/19/2016    Lab Results   Component Value Date     (H) 02/14/2018     BNP (pg/mL)   Date Value   02/14/2018 105 (H)   03/24/2017 41   02/06/2017 55          25 minutes were spent with the patient with greater than 50% spent on education and counseling.      Madeleine Zurita, [facility]   Heart Failure Clinic

## 2021-06-26 NOTE — PROGRESS NOTES
Progress Notes by Ray Pugh MD at 2/20/2018  8:50 AM     Author: Ray Pugh MD Service: -- Author Type: Physician    Filed: 2/20/2018  9:29 AM Encounter Date: 2/20/2018 Status: Signed    : Ray Pugh MD (Physician)           Click to link to [facility] Heart [facility] Heart Care Clinic Note      Assessment:    1. Moderate aortic stenosis     2. Cardiomyopathy, unspecified type  losartan (COZAAR) 25 MG tablet   3. Dyslipidemia, goal LDL below 100     4. Essential hypertension  losartan (COZAAR) 25 MG tablet       Plan:    1.  Start losartan 12.5 mg orally daily and return to see our nurse practitioner in the heart failure clinic in 2 weeks for further titration of losartan as well as beginning to reduce and eventually eliminate diltiazem, which is relatively contraindicated in the setting of cardiomyopathy.  Robinson may have a second visit with our nurse practitioner a few weeks after that.  2.  Return to see Dr. Pugh in 3 months.  3.  I advised him to discuss his persistent wheezing, cough, and fatigue with Dr. Ronald Saucedo.  Robinson states he has not had success with the antibiotics prescribed on 2 occasions by the ear nose and throat specialist.    An After Visit Summary was printed and given to the patient.    Subjective:    Chadd Henson returned for a planned 6 month follow up visit.  His wife accompanied him to the visit.    They have both been ill with upper respiratory infections.  Robinson states he has been to see an ear nose and throat specialist twice here on the [facility] and has been placed on 2 courses of antibiotics without improvement.  He reports a nonproductive cough, wheezing both with exertion and at night, and constant fatigue.  He does not experience angina pectoris, orthopnea, palpitations, lower extremity edema, or lightheadedness.    His wife states the price of his diltiazem has increased dramatically in the last year.    We discussed the  findings of his echocardiogram and the implications of her reduced ejection fraction.  I advised him that the minimal increase in his BNP level is not clinically significant at this time.    Past Medical History:    Patient Active Problem List   Diagnosis   ? Moderate aortic stenosis   ? Essential hypertension   ? Type 2 diabetes mellitus without complication   ? Dyslipidemia, goal LDL below 100   ? Cardiomyopathy       Past Medical History:   Diagnosis Date   ? Alcohol abuse 11/14/2007   ? Anemia    ? Aneurysm of thoracic aorta 2014    4.5 cm at [facility] in 2014, but 3.9 cm in ascending per MR read by Dr. Bucio in 2016   ? Atrial premature beats 4/17/2017   ? Bipolar affective disorder 2007    ECT times 6   ? Cardiomyopathy 3/14/2017   ? Chest pain with normal coronary angiography 03/14/2005    per Dr. Hunter at [facility]   ? COPD (chronic obstructive pulmonary disease)     mild at most per Dr. Ervin Young; Robinson gets montelukast and several inhalers from other MD's   ? DM II (diabetes mellitus, type II), controlled 2013   ? Dyslipidemia, goal LDL below 100    ? Essential hypertension    ? Gastroesophageal reflux disease 11/14/2007    Overview:  On ranitidine   ? History of transfusion    ? Moderate aortic stenosis 2/1/2017    mild to moderate by echo and MR   ? Nonocclusive coronary atherosclerosis of native coronary artery 01/11/2010    25% plaques per Dr. Verdugo at [facility] angio; Agatson score of 277 in 2017   ? MITZI (obstructive sleep apnea)     came off CPAP after 60 pound weight loss   ? Peripheral neuropathy    ? Rheumatoid arthritis     sees Dr. Johnson at [facility]; on methotrexate in 2017   ? Syndrome X, cardiac 2010    diagnosed by Dr. Chadd Heredia at [facility] and treated with isosorbide   ? TIA (transient ischemic attack) 10/22/2014    Another two episodes in Feb 2017 when he tried stopping ASA for three weeks. Per Dr. Ronald Saucedo, workup with Dr. Hsu of Neurology in 2014 did not  find a cause. He recommended ASA and Dr. Saucedo told patient that he will need to accept the bleeding risk of ASA.       Past Surgical History:   Procedure Laterality Date   ? Billroth II  1957    for gastric ulcers   ? CARDIAC CATHETERIZATION  2005    normal coronary angiogram at [facility] per Dr. Pardeep Hunter   ? CARDIAC CATHETERIZATION  2010    nonocclusive coronary plaques per Dr. Verdugo (25%)   ? ELECTROCONVULSIVE THERAPY     ? INGUINAL HERNIA REPAIR Left    ? LUMBAR DISCECTOMY      done 3 times   ? SKIN CANCER EXCISION     ? TOTAL ANKLE ARTHROPLASTY Left     by Dr. Oneal Montelongo, DPM, of [facility]   ? TRANSURETHRAL RESECTION OF PROSTATE     ? UMBILICAL HERNIA REPAIR      done 7 times; now okay        reports that he quit smoking about 56 years ago. His smoking use included Cigarettes. He has a 0.20 pack-year smoking history. He has never used smokeless tobacco. He reports that he does not drink alcohol or use illicit drugs.    Family History   Problem Relation Age of Onset   ? Anesthesia problems Sister    ? CABG Sister 65   ? Valvular heart disease Sister    ? Coronary Stenting Sister    ? Pacemaker Sister    ? Diabetes type II Mother    ? Hypertension Mother    ? Morbid Obesity Mother    ? Stroke Mother 56     cerebral hemorrhage at autopsy   ? Aortic aneurysm Father 65     ruptured AAA,  two days after surgery in [place]   ? CABG Brother 70   ? Acute Myocardial Infarction Brother 78      in air evac helicopter between [facility]   ? Coronary Stenting Brother    ? Breast cancer Daughter    ? Lymphoma Son      non Hodgkins ( at [facility])   ? Cancer Brother    ? No Medical Problems Daughter        Outpatient Encounter Prescriptions as of 2018   Medication Sig Dispense Refill   ? albuterol (PROVENTIL HFA;VENTOLIN HFA) 90 mcg/actuation inhaler Inhale 2 puffs every 6 (six) hours as needed for wheezing.     ? aspirin 81 mg chewable tablet  Chew 81 mg daily.     ? diltiazem (CARDIZEM CD) 240 MG 24 hr capsule Take 240 mg by mouth daily.     ? ETANERCEPT (ENBREL SUBQ) Inject under the skin once a week.     ? fluticasone-salmeterol (ADVAIR) 250-50 mcg/dose DISKUS Inhale 1 puff bedtime.     ? gabapentin (NEURONTIN) 300 MG capsule Take 300 mg by mouth every evening.      ? hydrOXYzine (VISTARIL) 50 MG capsule Take 1 capsule by mouth as needed.  2   ? isosorbide mononitrate (IMDUR) 60 MG 24 hr tablet Take 1 tablet by mouth daily.  3   ? leflunomide (ARAVA) 20 MG tablet Take 20 mg by mouth bedtime.     ? metFORMIN (GLUCOPHAGE) 500 MG tablet Take 500 mg by mouth. 3-AM, 2-PM.      ? mirtazapine (REMERON) 30 MG tablet Take 30 mg by mouth bedtime.     ? montelukast (SINGULAIR) 10 mg tablet Take 10 mg by mouth bedtime.     ? multivitamin therapeutic (THERAGRAN) tablet Take 1 tablet by mouth daily.     ? nitroglycerin (NITROSTAT) 0.4 MG SL tablet Place 0.4 mg under the tongue as needed.     ? omeprazole (PRILOSEC) 20 MG capsule 1 capsule daily.  2   ? simvastatin (ZOCOR) 10 MG tablet Take 1 tablet by mouth at bedtime.  3   ? albuterol (PROVENTIL) 2.5 mg /3 mL (0.083 %) nebulizer solution Take 2.5 mg by nebulization every 6 (six) hours as needed for wheezing.     ? losartan (COZAAR) 25 MG tablet Take 0.5 tablets (12.5 mg total) by mouth daily. 90 tablet 3   ? methotrexate 2.5 MG tablet Take 6 tablets by mouth once a week.  1   ? methotrexate 25 mg/mL injection weekly  1     No facility-administered encounter medications on file as of 2/20/2018.        Review of Systems:     General: WNL  Eyes: WNL  Ears/Nose/Throat: WNL  Lungs: Cough, Shortness of Breath, Wheezing  Heart: Shortness of Breath with activity, Irregular Heartbeat  Stomach: WNL  Bladder: Frequent Urination at Night  Muscle/Joints: Joint Pain, Muscle Weakness  Skin: Poor Wound Healing  Nervous System: Daytime Sleepiness, Loss of Balance  Mental Health: Anxiety     Blood: WNL    Objective:     /72  "(Patient Site: Right Arm, Patient Position: Sitting, Cuff Size: Adult Regular)  Pulse 92  Resp 16  Ht 5' 9\" (1.753 m)  Wt 199 lb (90.3 kg) Comment: with shoes  BMI 29.39 kg/m2  Wt Readings from Last 5 Encounters:   02/20/18 199 lb (90.3 kg)   02/14/18 194 lb (88 kg)   09/01/17 194 lb (88 kg)   04/17/17 194 lb (88 kg)   04/06/17 189 lb (85.7 kg)        Physical Exam:    GENERAL APPEARANCE: alert, no apparent distress  EYES: no scleral icterus  NECK: no carotid bruits or adenopathy, jugular venous pressure is difficult to evaluate due to obesity  CHEST: symmetric, the lungs have bilateral wheezes and coarse rhonchi  CARDIOVASCULAR: regular rhythm with a grade 2/6 systolic ejection murmur  EXTREMITIES: no cyanosis, clubbing or edema  SKIN: no xanthelasma  NEUROLOGIC: normal gait and coordination  PSYCHIATRIC: mood and affect are normal    Cardiac Testing:    Echocardiogram:      Left Ventricle: Normal left ventricular size.The estimated left ventricular ejection fraction is 40%. This represents a moderately decreased ejection fraction. Mild concentric hypertrophy noted.    Right Ventricle: Normal right ventricular size and systolic function.    Left Atrium: Left atrial volume is moderately increased.    Aortic Valve: Moderate calcific aortic stenosis (peak bailey: 3.1 m/sec, mean gradient: 26 mm Hg, JAMSHID: 1.2 cm2, SVi:41.5ml/m2, DI:0.3) . Mild aortic regurgitation.    Mild tricuspid valve regurgitation. No pulmonary hypertension present. The estimated systolic pulmonary artery pressure is 24 mmhg.    When compared to the previous study dated 2/17/2017, interval decline in left ventricular systolic function. No signficant change in aortic valve stenosis.       Results for orders placed during the hospital encounter of 03/14/17   NM Pharmacologic Stress Test [RXY477] 03/14/2017    Narrative   2.The left ventricular ejection fraction is 33%. This is severely   diminished.    1.The pharmacologic nuclear stress test is " negative for inducible   myocardial ischemia or infarction. Small inferoapical defect is most   likely diaphragmatic attenuation.    3.When compared to the images of 1/8/2010, there has been changes noted   when compared to report of prior study. The medium sized area of inferior   ischemia is not seen, and ejection fraction is now 33%, decreased from   56%.          Imaging:    No results found.    Lab Results:    Lab Results   Component Value Date    CREATININE 0.73 10/31/2016    BUN 8 10/31/2016     10/31/2016    K 4.4 10/31/2016     (H) 10/31/2016    CO2 20 (L) 10/31/2016     Lab Results   Component Value Date    CHOL 172 03/14/2017    TRIG 131 03/14/2017    HDL 38 (L) 03/14/2017     BNP (pg/mL)   Date Value   02/14/2018 105 (H)   03/24/2017 41   02/06/2017 55     Creatinine (mg/dL)   Date Value   10/31/2016 0.73   09/19/2016 0.77   05/24/2016 0.73   05/09/2016 0.85   05/09/2016 0.84     LDL Calculated (mg/dL)   Date Value   03/14/2017 108   10/31/2016 93   05/09/2016 94     Lab Results   Component Value Date    WBC 6.2 09/19/2016    WBC 5.1 07/06/2015    HGB 12.7 (L) 03/29/2017    HCT 32.8 (L) 09/19/2016    MCV 79 (L) 09/19/2016     09/19/2016           Much or all of the text in this note was generated through the use of the Dragon Dictate voice-to-text software. Errors in spelling or words which seem out of context are unintentional. Sound alike errors, in particular, may have escaped editing.

## 2021-06-26 NOTE — PROGRESS NOTES
Progress Notes by Madeleine Zurita CNP at 4/12/2018  8:30 AM     Author: Madeleine Zurita CNP Service: -- Author Type: Nurse Practitioner    Filed: 4/12/2018  9:03 AM Encounter Date: 4/12/2018 Status: Signed    : Madeleine Zurita CNP (Nurse Practitioner)           Click to link to [facility] Heart Care     [facility] HEART CARE NOTE      Assessment/Recommendations   Assessment:    1. Cardiomyopathy with systolic dysfunction, NYHA class III: He continues to have chronic fatigue and dyspnea on exertion.  His weights have remained stable.  He continues to follow a low-sodium diet. His cough has improved with the start of lasix.    2.  Hypertension: Controlled.  Blood pressure 118/78 in clinic today.  He monitors his blood pressures at home and are 130s-150s systolically and 60s-80s diastolically.  I encouraged him to continue to monitor blood pressures at home and call us in the interim if they tend to increase.    3.  Bradycardia: Heart rate today 48.  He monitors his heart rate at home and they have been 40s-50s.  He does complain of slight dizziness when standing too quickly. Denies any falls.    4.  Obstructive sleep apnea: He had his sleep study done on April 2, 2018 which showed upper airway resistance syndrome and periodic limb movement.  He has a sleep medicine appointment on April 24 and  who will discuss results with him which we discussed briefly today.    Plan:  1.   Heart failure medications:  - Beta blocker therapy with metroprolol succinate 25 mg daily  - Diuretic therapy with furosemide 20 mg daily  2.  Continue to monitor daily weights and follow low-sodium diet  3.  Discontinue diltiazem due to bradycardia.  Encouraged him to continue to monitor blood pressure and heart rates and call us in the interim if blood pressure increases.    Chadd Henson will follow up with Dr. Pugh May 21 as scheduled and in the heart failure clinic in 3 weeks.     History of Present Illness     Mr. Chadd Henson is a 77 y.o. male seen at [facility] heart failure clinic today for continued follow-up.  His wife accompanies him today.  He follows up for cardiomyopathy with systolic dysfunction.  His most recent echocardiogram was done February 14, 2018 which showed an ejection fraction of 40% along with moderate aortic stenosis.  His past medical history is significant for hypertension, hyperlipidemia, COPD, diabetes, and TIA.  He also has a history of premature atrial contractions.    Dr. Pugh recommended decreasing diltiazem and starting metoprolol for his cardiomyopathy.    During the last clinic visit, I decreased his diltiazem to 180 mg daily and started with Metroprolol 25 mg daily.  He states he has had slight dizziness especially when he stands too quickly.  He denies any falls.  He continues to have fatigue and dyspnea on exertion which have not worsened.  He denies any orthopnea or PND.  He denies any abdominal bloating or lower extremity edema.  He denies shortness of breath, orthopnea, PND, palpitations, chest pain, abdominal fullness/bloating and lower extremity edema.      He is monitoring home weights which are stable between 185-188 pounds.  He is following a low sodium diet.       ECHO 2/14/2018:   Summary     Left Ventricle: Normal left ventricular size.The estimated left ventricular ejection fraction is 40%. This represents a moderately decreased ejection fraction. Mild concentric hypertrophy noted.    Right Ventricle: Normal right ventricular size and systolic function.    Left Atrium: Left atrial volume is moderately increased.    Aortic Valve: Moderate calcific aortic stenosis (peak bailey: 3.1 m/sec, mean gradient: 26 mm Hg, JAMSHID: 1.2 cm2, SVi:41.5ml/m2, DI:0.3) . Mild aortic regurgitation.    Mild tricuspid valve regurgitation. No pulmonary hypertension present. The estimated systolic pulmonary artery pressure is 24 mmhg.    When compared to the previous study dated  2/17/2017, interval decline in left ventricular systolic function. No signficant change in aortic valve stenosis.          Physical Examination Review of Systems   Vitals:    04/12/18 0828   BP: 118/78   Pulse: (!) 48   Resp: 16     Body mass index is 28.92 kg/(m^2).  Wt Readings from Last 3 Encounters:   04/12/18 193 lb (87.5 kg)   03/22/18 190 lb (86.2 kg)   03/08/18 192 lb (87.1 kg)       General Appearance:     Alert, cooperative and in no acute distress.   ENT/Mouth: membranes moist, no oral lesions or bleeding gums.      EYES:  no scleral icterus, normal conjunctivae   Neck: no carotid bruits or thyromegaly   Chest/Lungs:   lungs are clear to auscultation, no rales or wheezing, respirations unlabored   Cardiovascular:   Regular. Normal first and second heart sounds with no murmurs, rubs, or gallops; the carotid, radial and posterior tibial pulses are intact, Jugular venous pressure normal, no edema    Abdomen:  Soft, nontender, nondistended, bowel sounds present   Extremities: no cyanosis or clubbing   Skin: warm, dry.    Neurologic: mood and affect are appropriate, alert and oriented x3      General: WNL  Eyes: WNL  Ears/Nose/Throat: WNL  Lungs: Cough  Heart: Shortness of Breath with activity  Stomach: WNL  Bladder: Frequent Urination at Night  Muscle/Joints: Joint Pain  Skin: WNL  Nervous System: WNL  Mental Health: WNL     Blood: WNL     Medical History  Surgical History Family History Social History   Past Medical History:   Diagnosis Date   ? Alcohol abuse 11/14/2007   ? Anemia    ? Aneurysm of thoracic aorta 2014    4.5 cm at [facility] in 2014, but 3.9 cm in ascending per MR read by Dr. Bucio in 2016   ? Atrial premature beats 4/17/2017   ? Bipolar affective disorder 2007    ECT times 6   ? Cardiomyopathy 3/14/2017   ? Chest pain with normal coronary angiography 03/14/2005    per Dr. [facility] at [facility]   ? COPD (chronic obstructive pulmonary disease)     mild at most per Dr. Ervin Young; Robinson gets montelukast  and several inhalers from other MD's   ? DM II (diabetes mellitus, type II), controlled 2013   ? Dyslipidemia, goal LDL below 100    ? Essential hypertension    ? Gastroesophageal reflux disease 11/14/2007    Overview:  On ranitidine   ? History of transfusion    ? Moderate aortic stenosis 2/1/2017    mild to moderate by echo and MR   ? Nonocclusive coronary atherosclerosis of native coronary artery 01/11/2010    25% plaques per Dr. Verdugo at [facility]; Agatson score of 277 in 2017   ? MITZI (obstructive sleep apnea)     came off CPAP after 60 pound weight loss   ? Peripheral neuropathy    ? Rheumatoid arthritis     sees Dr. Johnson at [facility]; on methotrexate in 2017   ? Syndrome X, cardiac 2010    diagnosed by Dr. Chadd Heredia at [facility] and treated with isosorbide   ? TIA (transient ischemic attack) 10/22/2014    Another two episodes in Feb 2017 when he tried stopping ASA for three weeks. Per Dr. Ronald Saucedo, workup with Dr. Hsu of Neurology in 2014 did not find a cause. He recommended ASA and Dr. Saucedo told patient that he will need to accept the bleeding risk of ASA.    Past Surgical History:   Procedure Laterality Date   ? Billroth II  1957    for gastric ulcers   ? CARDIAC CATHETERIZATION  03/14/2005    normal coronary angiogram at [facility] per Dr. Pardeep Hunter   ? CARDIAC CATHETERIZATION  01/11/2010    nonocclusive coronary plaques per Dr. Verdugo (25%)   ? ELECTROCONVULSIVE THERAPY  2007   ? INGUINAL HERNIA REPAIR Left    ? LUMBAR DISCECTOMY      done 3 times   ? SKIN CANCER EXCISION  2009   ? TOTAL ANKLE ARTHROPLASTY Left 2015    by Dr. Oneal Montelongo, DPM, of [facility]   ? TRANSURETHRAL RESECTION OF PROSTATE     ? UMBILICAL HERNIA REPAIR      done 7 times; now okay    Family History   Problem Relation Age of Onset   ? Anesthesia problems Sister    ? CABG Sister 65   ? Valvular heart disease Sister    ? Coronary Stenting Sister    ? Pacemaker Sister    ?  Diabetes type II Mother    ? Hypertension Mother    ? Morbid Obesity Mother    ? Stroke Mother 56     cerebral hemorrhage at autopsy   ? Aortic aneurysm Father 65     ruptured AAA,  two days after surgery in [place]   ? CABG Brother 70   ? Acute Myocardial Infarction Brother 78      in air evac helicopter between [facility]   ? Coronary Stenting Brother    ? Breast cancer Daughter    ? Lymphoma Son      non Hodgkins ( at [facility])   ? Cancer Brother    ? No Medical Problems Daughter     Social History     Social History   ? Marital status:      Spouse name: Dejan   ? Number of children: 3   ? Years of education: N/A     Occupational History   ? sales of auto parts Retired     [employer]     Social History Main Topics   ? Smoking status: Former Smoker     Packs/day: 0.05     Years: 4.00     Types: Cigarettes     Quit date: 1962   ? Smokeless tobacco: Never Used   ? Alcohol use No   ? Drug use: No   ? Sexual activity: Not on file     Other Topics Concern   ? Not on file     Social History Narrative    Four grandchildren          Medications  Allergies   Current Outpatient Prescriptions   Medication Sig Dispense Refill   ? albuterol (PROVENTIL HFA;VENTOLIN HFA) 90 mcg/actuation inhaler Inhale 2 puffs every 6 (six) hours as needed for wheezing.     ? albuterol (PROVENTIL) 2.5 mg /3 mL (0.083 %) nebulizer solution Take 2.5 mg by nebulization every 6 (six) hours as needed for wheezing.     ? aspirin 81 mg chewable tablet Chew 81 mg daily.     ? ETANERCEPT (ENBREL SUBQ) Inject under the skin once a week.     ? fluticasone-salmeterol (ADVAIR) 250-50 mcg/dose DISKUS Inhale 1 puff bedtime.     ? furosemide (LASIX) 20 MG tablet Take 1 tablet (20 mg total) by mouth daily. 90 tablet 0   ? gabapentin (NEURONTIN) 300 MG capsule Take 300 mg by mouth every evening.      ? hydrOXYzine (VISTARIL) 50 MG capsule Take 1 capsule by mouth as needed.  2   ? isosorbide mononitrate (IMDUR)  60 MG 24 hr tablet Take 1 tablet by mouth daily.  3   ? leflunomide (ARAVA) 20 MG tablet Take 20 mg by mouth bedtime.     ? metFORMIN (GLUCOPHAGE) 500 MG tablet Take 500 mg by mouth. 3-AM, 2-PM.      ? metoprolol succinate (TOPROL-XL) 25 MG Take 1 tablet (25 mg total) by mouth daily. 90 tablet 3   ? mirtazapine (REMERON) 30 MG tablet Take 30 mg by mouth bedtime.     ? montelukast (SINGULAIR) 10 mg tablet Take 10 mg by mouth bedtime.     ? multivitamin therapeutic (THERAGRAN) tablet Take 1 tablet by mouth daily.     ? nitroglycerin (NITROSTAT) 0.4 MG SL tablet Place 0.4 mg under the tongue as needed.     ? omeprazole (PRILOSEC) 20 MG capsule Take 1 capsule by mouth daily.   2   ? simvastatin (ZOCOR) 10 MG tablet Take 1 tablet by mouth at bedtime.  3   ? doxycycline (VIBRAMYCIN) 100 MG capsule Take 100 mg by mouth 2 (two) times a day.  0   ? methotrexate 2.5 MG tablet Take 6 tablets by mouth once a week.  1   ? methotrexate 25 mg/mL injection weekly  1   ? predniSONE (DELTASONE) 10 mg tablet Take 10 mg by mouth 2 (two) times a day.  0     No current facility-administered medications for this visit.       Allergies   Allergen Reactions   ? Codeine Other (See Comments)     Kidney and liver shuts down and sharp stomach pain     ? Crestor [Rosuvastatin]      Upset stomach   ? Morphine Other (See Comments)     Kidneys and liver shut down   ? Vicodin [Hydrocodone-Acetaminophen] Other (See Comments)     Sharp stomach pains   ? Losartan Angiodema         Lab Results    Chemistry CBC BNP   Lab Results   Component Value Date    CREATININE 0.79 03/19/2018    BUN 6 (L) 03/19/2018     03/19/2018    K 4.3 03/19/2018     03/19/2018    CO2 28 03/19/2018     Creatinine (mg/dL)   Date Value   03/19/2018 0.79   03/08/2018 0.85   10/31/2016 0.73   09/19/2016 0.77    Lab Results   Component Value Date    WBC 6.2 09/19/2016    HGB 12.7 (L) 03/29/2017    HCT 32.8 (L) 09/19/2016    MCV 79 (L) 09/19/2016     09/19/2016     Lab Results   Component Value Date     (H) 02/14/2018     BNP (pg/mL)   Date Value   02/14/2018 105 (H)   03/24/2017 41   02/06/2017 55          25 minutes were spent with the patient with greater than 50% spent on education and counseling.      Madeleine Zurita, [facility]   Heart Failure Clinic

## 2021-06-26 NOTE — PROGRESS NOTES
"Progress Notes by Ray Pugh MD at 5/21/2018  8:10 AM     Author: Ray Pugh MD Service: -- Author Type: Physician    Filed: 5/21/2018  8:52 AM Encounter Date: 5/21/2018 Status: Signed    : Ray Pugh MD (Physician)           Click to link to [facility] Heart [facility] Heart Care Clinic Note      Assessment:    1. Chronic systolic heart failure  metoprolol succinate (TOPROL-XL) 25 MG    Echo Complete    BNP(B-type Natriuretic Peptide)   2. Cardiomyopathy, unspecified type     3. Moderate aortic stenosis         Plan:    1.  Robinson has premature beats which artificially depress his radial pulse.  Holter monitoring earlier this year showed normal heart rate range and his apical pulse today is 90 bpm, not the 55 bpm detected by the medical assistant.  Therefore, I will increase his metoprolol succinate to 37.5 mg orally daily  2.  Robinson will return to see Madeleine on June 13 for continued metoprolol titration.  If there are future questions about his heart rate I would recommend repeat Holter monitoring.  3.  Return to see Dr. Pugh in 5 months after new echocardiogram and BNP level.    An After Visit Summary was printed and given to the patient.    Subjective:    Chadd Henson returned for a planned follow up visit.  His wife, Arpita, accompanied him to the visit.    He recently had surgery on both feet by Dr. Oneal Montelongo, D.P.M.  Robinson feels he is making an excellent recovery from that surgery.  He states that isosorbide was deleted from his medication list when he transferred pharmacies and he has not taken it for more than a month.  He does not experience any anginal symptoms.  It was prescribed by Dr. Chadd Heredia for \"syndrome X\".    Robinson developed angioedema of the face from losartan.  He is successfully been weaned off diltiazem and is now on low-dose metoprolol succinate.  He feels well on this medication.  He is not aware of his premature beats.    He does not " experience syncope, orthopnea, or lower extremity edema.    Past Medical History:    Patient Active Problem List   Diagnosis   ? Moderate aortic stenosis   ? Essential hypertension   ? Type 2 diabetes mellitus without complication   ? Dyslipidemia, goal LDL below 100   ? Cardiomyopathy   ? Heart failure with reduced ejection fraction   ? Bronchiectasis       Past Medical History:   Diagnosis Date   ? Alcohol abuse 11/14/2007   ? Anemia    ? Aneurysm of thoracic aorta 2014    4.5 cm at [facility] in 2014, but 3.9 cm in ascending per MR read by Dr. Bucio in 2016   ? Asthma     mild   ? Atrial premature beats 4/17/2017   ? Bipolar affective disorder 2007    ECT times 6   ? Cardiomyopathy 3/14/2017   ? Chest pain with normal coronary angiography 03/14/2005    [facility] Dr. Hunter at [facility]   ? COPD (chronic obstructive pulmonary disease)     mild at most per Dr. Ervin Young; Robinson gets montelukast and several inhalers from other MD's   ? DM II (diabetes mellitus, type II), controlled 2013   ? Dyslipidemia, goal LDL below 100    ? Essential hypertension    ? Gastroesophageal reflux disease 11/14/2007    Overview:  On ranitidine   ? History of transfusion    ? Moderate aortic stenosis 2/1/2017    mild to moderate by echo and MR   ? Nonocclusive coronary atherosclerosis of native coronary artery 01/11/2010    25% plaques per Dr. Verdugo at [facility] angio; Agatson score of 277 in 2017   ? Peripheral neuropathy    ? Rheumatoid arthritis     sees Dr. Johnson at [facility]; on methotrexate in 2017   ? Syndrome X, cardiac 2010    diagnosed by Dr. Chadd Heredia at [facility] and treated with isosorbide   ? TIA (transient ischemic attack) 10/22/2014    Another two episodes in Feb 2017 when he tried stopping ASA for three weeks. Per Dr. Ronald Saucedo, workup with Dr. Hsu of Neurology in 2014 did not find a cause. He recommended ASA and Dr. Saucedo told patient that he will need to accept the bleeding risk of ASA.        Past Surgical History:   Procedure Laterality Date   ? Billroth II  1957    for gastric ulcers   ? CARDIAC CATHETERIZATION  2005    normal coronary angiogram at [facility] per Dr. Pardeep Hunter   ? CARDIAC CATHETERIZATION  2010    nonocclusive coronary plaques per Dr. Verdugo (25%)   ? ELECTROCONVULSIVE THERAPY     ? INGUINAL HERNIA REPAIR Left    ? LUMBAR DISCECTOMY      done 3 times   ? SKIN CANCER EXCISION     ? TOTAL ANKLE ARTHROPLASTY Left     by Dr. Oneal Montelongo, DPM, of [facility]   ? TRANSURETHRAL RESECTION OF PROSTATE     ? UMBILICAL HERNIA REPAIR      done 7 times; now okay        reports that he quit smoking about 56 years ago. His smoking use included Cigarettes. He has a 0.20 pack-year smoking history. He has never used smokeless tobacco. He reports that he does not drink alcohol or use illicit drugs.    Family History   Problem Relation Age of Onset   ? Anesthesia problems Sister    ? CABG Sister 65   ? Valvular heart disease Sister    ? Coronary Stenting Sister    ? Pacemaker Sister    ? Diabetes type II Mother    ? Hypertension Mother    ? Morbid Obesity Mother    ? Stroke Mother 56     cerebral hemorrhage at autopsy   ? Aortic aneurysm Father 65     ruptured AAA,  two days after surgery in [place]   ? CABG Brother 70   ? Acute Myocardial Infarction Brother 78      in air evac helicopter between [facility]   ? Coronary Stenting Brother    ? Breast cancer Daughter    ? Lymphoma Son      non Hodgkins ( at [facility])   ? Cancer Brother    ? No Medical Problems Daughter        Outpatient Encounter Prescriptions as of 2018   Medication Sig Dispense Refill   ? albuterol (PROVENTIL HFA;VENTOLIN HFA) 90 mcg/actuation inhaler Inhale 2 puffs every 6 (six) hours as needed for wheezing.     ? aspirin 81 mg chewable tablet Chew 81 mg daily.     ? ETANERCEPT (ENBREL SUBQ) Inject 25 mg under the skin once a week.      ?  fluticasone-salmeterol (ADVAIR) 500-50 mcg/dose DISKUS Inhale 1 puff 2 (two) times a day as needed.     ? furosemide (LASIX) 20 MG tablet Take 1 tablet (20 mg total) by mouth daily. 90 tablet 3   ? gabapentin (NEURONTIN) 300 MG capsule Take 300 mg by mouth every evening.      ? hydrOXYzine (VISTARIL) 50 MG capsule Take 1 capsule by mouth daily as needed for anxiety.   2   ? hydrOXYzine pamoate (VISTARIL) 25 MG capsule Take 2 capsules (50 mg total) by mouth every 6 (six) hours as needed for itching. 35 capsule 0   ? metFORMIN (GLUCOPHAGE) 500 MG tablet Take 1,500 mg by mouth daily with breakfast. 1500mg q am and 1000mg q pm     ? metFORMIN (GLUCOPHAGE) 500 MG tablet Take 1,000 mg by mouth every evening. 1500mg q am and 1000mg q pm     ? metoprolol succinate (TOPROL-XL) 25 MG Take 1.5 tablets (37.5 mg total) by mouth daily. 135 tablet 3   ? mirtazapine (REMERON) 30 MG tablet Take 30 mg by mouth bedtime.     ? montelukast (SINGULAIR) 10 mg tablet Take 10 mg by mouth bedtime.     ? multivitamin therapeutic (THERAGRAN) tablet Take 1 tablet by mouth daily.     ? nitroglycerin (NITROSTAT) 0.4 MG SL tablet Place 0.4 mg under the tongue as needed.     ? omeprazole (PRILOSEC) 20 MG capsule Take 1 capsule by mouth daily.   2   ? simvastatin (ZOCOR) 10 MG tablet Take 10 mg by mouth at bedtime.   3   ? traMADol (ULTRAM) 50 mg tablet Take 1 tablet (50 mg total) by mouth every 6 (six) hours as needed for pain. 35 tablet 0   ? [DISCONTINUED] metoprolol succinate (TOPROL-XL) 25 MG Take 1 tablet (25 mg total) by mouth daily. 90 tablet 3     No facility-administered encounter medications on file as of 5/21/2018.        Review of Systems:     General: WNL  Eyes: WNL  Ears/Nose/Throat: WNL  Lungs: WNL  Heart: WNL  Stomach: WNL  Bladder: WNL  Muscle/Joints: WNL  Skin: WNL  Nervous System: WNL  Mental Health: WNL     Blood: WNL    Objective:     /64 (Patient Site: Right Arm, Patient Position: Sitting, Cuff Size: Adult Regular)   "Pulse 90  Resp 18  Ht 5' 8.5\" (1.74 m)  Wt 190 lb (86.2 kg)  BMI 28.47 kg/m2  Wt Readings from Last 5 Encounters:   05/21/18 190 lb (86.2 kg)   05/17/18 183 lb 8 oz (83.2 kg)   05/02/18 192 lb (87.1 kg)   04/24/18 191 lb 9.6 oz (86.9 kg)   04/12/18 193 lb (87.5 kg)        Physical Exam:    GENERAL APPEARANCE: alert, no apparent distress  EYES: no scleral icterus  NECK: no carotid bruits or adenopathy, jugular venous pressure is less than 5 cm  CHEST: symmetric, the lungs are clear to auscultation  CARDIOVASCULAR: regular rhythm with frequent premature beats and a grade 2/6 early peaking systolic ejection murmur  EXTREMITIES: no cyanosis, clubbing or edema  SKIN: no xanthelasma  NEUROLOGIC: normal gait and coordination  PSYCHIATRIC: mood and affect are normal    Cardiac Testing:    Echocardiogram:   Results for orders placed during the hospital encounter of 02/14/18   Echo Complete [ECH10] 02/14/2018    Narrative   Left Ventricle: Normal left ventricular size.The estimated left   ventricular ejection fraction is 40%. This represents a moderately   decreased ejection fraction. Mild concentric hypertrophy noted.    Right Ventricle: Normal right ventricular size and systolic function.    Left Atrium: Left atrial volume is moderately increased.    Aortic Valve: Moderate calcific aortic stenosis (peak bailey: 3.1 m/sec,   mean gradient: 26 mm Hg, JAMSHID: 1.2 cm2, SVi:41.5ml/m2, DI:0.3) . Mild   aortic regurgitation.    Mild tricuspid valve regurgitation. No pulmonary hypertension present.   The estimated systolic pulmonary artery pressure is 24 mmhg.    When compared to the previous study dated 2/17/2017, interval decline in   left ventricular systolic function. No signficant change in aortic valve   stenosis.          Results for orders placed during the hospital encounter of 03/14/17   NM Pharmacologic Stress Test [[id number]] 03/14/2017    Narrative   2.The left ventricular ejection fraction is 33%. This is severely "   diminished.    1.The pharmacologic nuclear stress test is negative for inducible   myocardial ischemia or infarction. Small inferoapical defect is most   likely diaphragmatic attenuation.    3.When compared to the images of 1/8/2010, there has been changes noted   when compared to report of prior study. The medium sized area of inferior   ischemia is not seen, and ejection fraction is now 33%, decreased from   56%.          Imaging:    No results found.    Lab Results:    Lab Results   Component Value Date    CREATININE 0.75 05/14/2018    BUN 9 05/14/2018     05/14/2018    K 4.2 05/14/2018     05/14/2018    CO2 23 05/14/2018     Lab Results   Component Value Date    CHOL 158 03/19/2018    TRIG 90 03/19/2018    HDL 48 03/19/2018     BNP (pg/mL)   Date Value   02/14/2018 105 (H)   03/24/2017 41   02/06/2017 55     Creatinine (mg/dL)   Date Value   05/14/2018 0.75   03/19/2018 0.79   03/08/2018 0.85   10/31/2016 0.73     LDL Calculated (mg/dL)   Date Value   03/19/2018 92   03/14/2017 108   10/31/2016 93     Lab Results   Component Value Date    WBC 6.2 09/19/2016    WBC 5.1 07/06/2015    HGB 12.7 (L) 03/29/2017    HCT 32.8 (L) 09/19/2016    MCV 79 (L) 09/19/2016     09/19/2016           Much or all of the text in this note was generated through the use of the Dragon Dictate voice-to-text software. Errors in spelling or words which seem out of context are unintentional. Sound alike errors, in particular, may have escaped editing.

## 2021-06-26 NOTE — PROGRESS NOTES
Progress Notes by Madeleine Zurita CNP at 7/12/2018  8:30 AM     Author: Madeleine Zurita CNP Service: -- Author Type: Nurse Practitioner    Filed: 7/12/2018  8:58 AM Encounter Date: 7/12/2018 Status: Signed    : Madeleine Zurita CNP (Nurse Practitioner)           Click to link to [facility] HEART CARE NOTE      Assessment/Recommendations   Assessment:    1.  Cardiomyopathy with systolic dysfunction, NYHA class II: Well compensated.  No signs of fluid retention.  He continues to have fatigue and dyspnea on exertion.  His weights have remained stable.  He continues to follow a low-sodium diet.  He will have an echocardiogram in October prior to seeing Dr. Pugh.  His metoprolol was increased at his last appointment and he had dizziness and lightheadedness.  His primary doctor reduced metoprolol back to 25 mg daily and he has been tolerating this well.    2.  Hypertension: Controlled.  Blood pressure 120/70    Plan:  1.  Continue current medications  2.  Continue daily weights and low-sodium diet  3.  Encouraged regular exercise    Chadd Henson will follow up with Dr. Pugh in October and in the heart failure clinic in 6 weeks.     History of Present Illness    Mr. Chadd Henson is a 77 y.o. male seen at [facility] heart failure clinic today for continued follow-up.  He follows up for cardiomyopathy with systolic dysfunction.  His most recent echocardiogram was done February 14, 2018 which showed an ejection fraction of 40% along with moderate aortic stenosis.  He has a past medical history of hypertension, hyperlipidemia, COPD, obstructive sleep apnea, diabetes, and TIA.    During the last clinic visit, trace his metoprolol to 50 mg daily.  He had an episode of lightheadedness and dizziness.  He also had blurred vision.  His primary doctor reduced his metoprolol back down to 25 mg daily.  He states he has tolerated this dose well.  He denies any dizziness  since then.  He continues to have fatigue and dyspnea on exertion which are stable.  He denies orthopnea or PND.  He denies lightheadedness, shortness of breath, orthopnea, PND, palpitations, chest pain, abdominal fullness/bloating and lower extremity edema.      He is monitoring home weights which are stable between 184-190 pounds.  He is following a low sodium diet.      ECHO 2/14/2018:   Summary     Left Ventricle: Normal left ventricular size.The estimated left ventricular ejection fraction is 40%. This represents a moderately decreased ejection fraction. Mild concentric hypertrophy noted.    Right Ventricle: Normal right ventricular size and systolic function.    Left Atrium: Left atrial volume is moderately increased.    Aortic Valve: Moderate calcific aortic stenosis (peak bailey: 3.1 m/sec, mean gradient: 26 mm Hg, JAMSHID: 1.2 cm2, SVi:41.5ml/m2, DI:0.3) . Mild aortic regurgitation.    Mild tricuspid valve regurgitation. No pulmonary hypertension present. The estimated systolic pulmonary artery pressure is 24 mmhg.    When compared to the previous study dated 2/17/2017, interval decline in left ventricular systolic function. No signficant change in aortic valve stenosis.          Physical Examination Review of Systems   Vitals:    07/12/18 0840   BP: 120/70   Pulse: 83   Resp: 16     Body mass index is 28.06 kg/(m^2).  Wt Readings from Last 3 Encounters:   07/12/18 190 lb (86.2 kg)   06/13/18 193 lb (87.5 kg)   05/21/18 190 lb (86.2 kg)       General Appearance:     Alert, cooperative and in no acute distress.   ENT/Mouth: membranes moist, no oral lesions or bleeding gums.      EYES:  no scleral icterus, normal conjunctivae   Neck: no carotid bruits or thyromegaly   Chest/Lungs:   lungs are clear to auscultation, no rales or wheezing, respirations unlabored   Cardiovascular:   Regular. Normal first and second heart sounds with no murmurs, rubs, or gallops; the carotid, radial and posterior tibial pulses are  intact, Jugular venous pressure normal, no edema    Abdomen:  Soft, nontender, nondistended, bowel sounds present   Extremities: no cyanosis or clubbing   Skin: warm, dry.    Neurologic: mood and affect are appropriate, alert and oriented x3      General: WNL  Eyes: WNL  Ears/Nose/Throat: WNL  Lungs: WNL  Heart: WNL  Stomach: WNL  Bladder: WNL  Muscle/Joints: Joint Pain  Skin: WNL  Nervous System: WNL  Mental Health: WNL     Blood: WNL     Medical History  Surgical History Family History Social History   Past Medical History:   Diagnosis Date   ? Alcohol abuse 11/14/2007   ? Anemia    ? Aneurysm of thoracic aorta (H) 2014    4.5 cm at [facility] in 2014, but 3.9 cm in ascending per MR read by Dr. Bucio in 2016   ? Asthma     mild   ? Atrial premature beats 4/17/2017   ? Bipolar affective disorder (H) 2007    ECT times 6   ? Cardiomyopathy (H) 3/14/2017   ? Chest pain with normal coronary angiography 03/14/2005    per Dr. Hunter at [facility]   ? COPD (chronic obstructive pulmonary disease) (H)     mild at most per Dr. Ervin Young; Robinson gets montelukast and several inhalers from other MD's   ? DM II (diabetes mellitus, type II), controlled (H) 2013   ? Dyslipidemia, goal LDL below 100    ? Essential hypertension    ? Gastroesophageal reflux disease 11/14/2007    Overview:  On ranitidine   ? History of transfusion    ? Moderate aortic stenosis 2/1/2017    mild to moderate by echo and MR   ? Nonocclusive coronary atherosclerosis of native coronary artery 01/11/2010    25% plaques per Dr. Verdugo at [facility] angio; Agatson score of 277 in 2017   ? Peripheral neuropathy (H)    ? Rheumatoid arthritis (H)     sees Dr. Johnson at [facility]; on methotrexate in 2017   ? Syndrome X, cardiac (H) 2010    diagnosed by Dr. Chadd Heredia at [facility] and treated with isosorbide   ? TIA (transient ischemic attack) 10/22/2014    Another two episodes in Feb 2017 when he tried stopping ASA for three weeks. Per Dr. Cardenas  Lewis, workup with Dr. sHu of Neurology in  did not find a cause. He recommended ASA and Dr. Saucedo told patient that he will need to accept the bleeding risk of ASA.    Past Surgical History:   Procedure Laterality Date   ? Billroth II  1957    for gastric ulcers   ? BUNIONECTOMY Right 2018    Procedure: TAILORS BUNION CORRECTION RIGHT FOOT;  Surgeon: Oneal Montelongo DPM;  Location: [facility] OR;  Service:    ? CARDIAC CATHETERIZATION  2005    normal coronary angiogram at [facility] per Dr. Pardeep Hunter   ? CARDIAC CATHETERIZATION  2010    nonocclusive coronary plaques per Dr. Verdugo (25%)   ? CORRECTION HAMMER TOE Left 2018    Procedure: CORRECTION HAMMER TOE 2ND, LEFT FOOT ;  Surgeon: Oneal Montelongo DPM;  Location: [facility] OR;  Service:    ? ELECTROCONVULSIVE THERAPY     ? HARDWARE REMOVAL Left 2018    Procedure: REMOVAL OF HARDWARE 5TH METATARSAL LEFT;  Surgeon: Oneal Montelongo DPM;  Location: [facility] OR;  Service:    ? INGUINAL HERNIA REPAIR Left    ? LUMBAR DISCECTOMY      done 3 times   ? FL PART REMV BONE METATARSAL HEAD,EA Left 2018    Procedure: EXOSTECTOMY 4, 5 TARSOMETATARSAL LEFT;  Surgeon: Oneal Montelongo DPM;  Location: [facility];  Service: Podiatry   ? SKIN CANCER EXCISION     ? TOTAL ANKLE ARTHROPLASTY Left     by Dr. Oneal Montelongo DPM, of [facility]   ? TRANSURETHRAL RESECTION OF PROSTATE     ? UMBILICAL HERNIA REPAIR      done 7 times; now okay    Family History   Problem Relation Age of Onset   ? Anesthesia problems Sister    ? CABG Sister 65   ? Valvular heart disease Sister    ? Coronary Stenting Sister    ? Pacemaker Sister    ? Diabetes type II Mother    ? Hypertension Mother    ? Morbid Obesity Mother    ? Stroke Mother 56     cerebral hemorrhage at autopsy   ? Aortic aneurysm Father 65     ruptured AAA,  two days after surgery in [place]   ? CABG Brother 70   ? Acute Myocardial Infarction Brother  78      in air evac helicopter between [facility]   ? Coronary Stenting Brother    ? Breast cancer Daughter    ? Lymphoma Son      non Hodgkins ( at [facility])   ? Cancer Brother    ? No Medical Problems Daughter     Social History     Social History   ? Marital status:      Spouse name: Dejan   ? Number of children: 3   ? Years of education: N/A     Occupational History   ? sales of auto parts Retired     [employer]     Social History Main Topics   ? Smoking status: Former Smoker     Packs/day: 0.05     Years: 4.00     Types: Cigarettes     Quit date: 1962   ? Smokeless tobacco: Never Used   ? Alcohol use No   ? Drug use: No   ? Sexual activity: Not on file     Other Topics Concern   ? Not on file     Social History Narrative    Four grandchildren          Medications  Allergies   Current Outpatient Prescriptions   Medication Sig Dispense Refill   ? albuterol (PROVENTIL HFA;VENTOLIN HFA) 90 mcg/actuation inhaler Inhale 2 puffs every 6 (six) hours as needed for wheezing.     ? aspirin 81 mg chewable tablet Chew 81 mg daily.     ? ETANERCEPT (ENBREL SUBQ) Inject 25 mg under the skin once a week.      ? fluticasone-salmeterol (ADVAIR) 500-50 mcg/dose DISKUS Inhale 1 puff 2 (two) times a day as needed.     ? furosemide (LASIX) 20 MG tablet Take 1 tablet (20 mg total) by mouth daily. 90 tablet 3   ? gabapentin (NEURONTIN) 300 MG capsule Take 300 mg by mouth every evening.      ? hydrOXYzine (VISTARIL) 50 MG capsule Take 1 capsule by mouth daily as needed for anxiety.   2   ? hydrOXYzine pamoate (VISTARIL) 25 MG capsule Take 2 capsules (50 mg total) by mouth every 6 (six) hours as needed for itching. 35 capsule 0   ? metFORMIN (GLUCOPHAGE) 500 MG tablet Take 1,500 mg by mouth daily with breakfast. 1500mg q am and 1000mg q pm     ? metFORMIN (GLUCOPHAGE) 500 MG tablet Take 1,000 mg by mouth every evening. 1500mg q am and 1000mg q pm     ? metoprolol succinate (TOPROL-XL)  25 MG Take 1 tablet (25 mg total) by mouth daily. 135 tablet 3   ? mirtazapine (REMERON) 30 MG tablet Take 30 mg by mouth bedtime.     ? montelukast (SINGULAIR) 10 mg tablet Take 10 mg by mouth bedtime.     ? multivitamin therapeutic (THERAGRAN) tablet Take 1 tablet by mouth daily.     ? nitroglycerin (NITROSTAT) 0.4 MG SL tablet Place 0.4 mg under the tongue as needed.     ? omeprazole (PRILOSEC) 20 MG capsule Take 1 capsule by mouth daily.   2   ? simvastatin (ZOCOR) 10 MG tablet Take 10 mg by mouth at bedtime.   3   ? traMADol (ULTRAM) 50 mg tablet Take 1 tablet (50 mg total) by mouth every 6 (six) hours as needed for pain. 35 tablet 0     No current facility-administered medications for this visit.       Allergies   Allergen Reactions   ? Codeine Other (See Comments)     Kidney and liver shuts down and sharp stomach pain     ? Crestor [Rosuvastatin]      Upset stomach   ? Morphine Other (See Comments)     Kidneys and liver shut down   ? Percocet [Oxycodone-Acetaminophen]      Deathly ill/ cramps   ? Vicodin [Hydrocodone-Acetaminophen] Other (See Comments)     Sharp stomach pains   ? Losartan Angiodema         Lab Results    Chemistry CBC BNP   Lab Results   Component Value Date    CREATININE 0.75 05/14/2018    BUN 9 05/14/2018     05/14/2018    K 4.2 05/14/2018     05/14/2018    CO2 23 05/14/2018     Creatinine (mg/dL)   Date Value   05/14/2018 0.75   03/19/2018 0.79   03/08/2018 0.85   10/31/2016 0.73    Lab Results   Component Value Date    WBC 6.2 09/19/2016    HGB 12.7 (L) 03/29/2017    HCT 32.8 (L) 09/19/2016    MCV 79 (L) 09/19/2016     09/19/2016    Lab Results   Component Value Date     (H) 02/14/2018     BNP (pg/mL)   Date Value   02/14/2018 105 (H)   03/24/2017 41   02/06/2017 55          20 minutes were spent with the patient with greater than 50% spent on education and counseling.      Madeleine Zurita, [facility]   Heart Failure Clinic

## 2021-06-26 NOTE — PROGRESS NOTES
Progress Notes by Madeleine Zurita CNP at 6/13/2018  8:30 AM     Author: Madeleine Zurita CNP Service: -- Author Type: Nurse Practitioner    Filed: 6/13/2018  8:40 AM Encounter Date: 6/13/2018 Status: Signed    : Madeleine Zurita CNP (Nurse Practitioner)           Click to link to [facility] HEART CARE NOTE      Assessment/Recommendations   Assessment:    1. Cardiomyopathy with systolic dysfunction, NYHA class II: Well compensated.  He continues to have mild dyspnea on exertion.  His weights increased slightly due to being off Lasix for about a week.  He got this refilled and his weights have decreased.  He continues to follow a low-sodium diet.  He will have an echocardiogram in October when he sees Dr. Pugh.    2.  Hypertension: Controlled.  Blood pressure 122/56    Plan:  1.   Heart failure medications:  - Beta blocker therapy increased metroprolol succinate 50 mg daily  - Diuretic therapy with furosemide 20 mg daily  2.  Continue daily weights and low-sodium diet    Chadd Henson will follow up with Dr. Pugh in October and in the heart failure clinic in 4 weeks.     History of Present Illness    Mr. Chadd Henson is a 77 y.o. male seen at [facility] heart failure clinic today for continued follow-up.  He follows up for cardiomyopathy with systolic dysfunction.  His most recent echocardiogram was done February 14, 2018 which showed an ejection fraction of 40% along with moderate aortic stenosis.  He is a past medical history significant for hypertension, hyperlipidemia, COPD, diabetes, PVC's, TIA, and obstructive sleep apnea.    During the last clinic visit, Dr. Pugh increase his metoprolol to 37.5 mg daily.  He states he tolerated this well.  He denies any dizziness or lightheadedness.  He denies any fatigue.  He continues to have mild dyspnea on exertion.  He denies orthopnea or PND.  He denies fatigue, lightheadedness, shortness of breath,  orthopnea, PND, palpitations, chest pain, abdominal fullness/bloating and lower extremity edema.      He is monitoring home weights which are stable between 186-191 pounds. He ran out of his lasix and was off of it for about a week. His weights have decreased since restarting it. He is following a low sodium diet.         Physical Examination Review of Systems   Vitals:    06/13/18 0815   BP: 122/56   Pulse: 64   Resp: 16     Body mass index is 28.5 kg/(m^2).  Wt Readings from Last 3 Encounters:   06/13/18 193 lb (87.5 kg)   05/21/18 190 lb (86.2 kg)   05/17/18 183 lb 8 oz (83.2 kg)       General Appearance:     Alert, cooperative and in no acute distress.   ENT/Mouth: membranes moist, no oral lesions or bleeding gums.      EYES:  no scleral icterus, normal conjunctivae   Neck: no carotid bruits or thyromegaly   Chest/Lungs:   lungs are clear to auscultation, no rales or wheezing, respirations unlabored   Cardiovascular:   Regular. Normal first and second heart sounds with grade 2/6 systolic murmur, no rubs, or gallops; the carotid, radial and posterior tibial pulses are intact, Jugular venous pressure normal, no edema   Abdomen:  Soft, nontender, nondistended, bowel sounds present   Extremities: no cyanosis or clubbing   Skin: warm, dry.    Neurologic: mood and affect are appropriate, alert and oriented x3      General: WNL  Eyes: WNL  Ears/Nose/Throat: WNL  Lungs: WNL  Heart: Arm Pain  Stomach: WNL  Bladder: WNL  Muscle/Joints: Joint Pain  Skin: WNL  Nervous System: WNL  Mental Health: WNL     Blood: WNL     Medical History  Surgical History Family History Social History   Past Medical History:   Diagnosis Date   ? Alcohol abuse 11/14/2007   ? Anemia    ? Aneurysm of thoracic aorta (H) 2014    4.5 cm at [facility] in 2014, but 3.9 cm in ascending per MR read by Dr. Bucio in 2016   ? Asthma     mild   ? Atrial premature beats 4/17/2017   ? Bipolar affective disorder (H) 2007    ECT times 6   ? Cardiomyopathy (H)  3/14/2017   ? Chest pain with normal coronary angiography 03/14/2005    per Dr. Hunter at [facility]   ? COPD (chronic obstructive pulmonary disease) (H)     mild at most per Dr. Ervin Young; Robinson gets montelukast and several inhalers from other MD's   ? DM II (diabetes mellitus, type II), controlled (H) 2013   ? Dyslipidemia, goal LDL below 100    ? Essential hypertension    ? Gastroesophageal reflux disease 11/14/2007    Overview:  On ranitidine   ? History of transfusion    ? Moderate aortic stenosis 2/1/2017    mild to moderate by echo and MR   ? Nonocclusive coronary atherosclerosis of native coronary artery 01/11/2010    25% plaques per Dr. Verdugo at [facility] angio; Agatson score of 277 in 2017   ? Peripheral neuropathy (H)    ? Rheumatoid arthritis (H)     sees Dr. Johnson at [facility]; on methotrexate in 2017   ? Syndrome X, cardiac (H) 2010    diagnosed by Dr. Chadd Heredia at [facility] and treated with isosorbide   ? TIA (transient ischemic attack) 10/22/2014    Another two episodes in Feb 2017 when he tried stopping ASA for three weeks. Per Dr. Ronald Saucedo, workup with Dr. Hsu of Neurology in 2014 did not find a cause. He recommended ASA and Dr. Saucedo told patient that he will need to accept the bleeding risk of ASA.    Past Surgical History:   Procedure Laterality Date   ? Billroth II  1957    for gastric ulcers   ? BUNIONECTOMY Right 5/17/2018    Procedure: TAILORS BUNION CORRECTION RIGHT FOOT;  Surgeon: Oneal Montelongo DPM;  Location: [facility] OR;  Service:    ? CARDIAC CATHETERIZATION  03/14/2005    normal coronary angiogram at [facility] per Dr. Pardeep Hunter   ? CARDIAC CATHETERIZATION  01/11/2010    nonocclusive coronary plaques per Dr. Verdugo (25%)   ? CORRECTION HAMMER TOE Left 5/17/2018    Procedure: CORRECTION HAMMER TOE 2ND, LEFT FOOT ;  Surgeon: Oneal Montelongo DPM;  Location: [facility] OR;  Service:    ? ELECTROCONVULSIVE THERAPY  2007   ? HARDWARE  REMOVAL Left 2018    Procedure: REMOVAL OF HARDWARE 5TH METATARSAL LEFT;  Surgeon: Oneal Montelongo DPM;  Location: [facility] OR;  Service:    ? INGUINAL HERNIA REPAIR Left    ? LUMBAR DISCECTOMY      done 3 times   ? NM PART REMV BONE METATARSAL HEAD,EA Left 2018    Procedure: EXOSTECTOMY 4, 5 TARSOMETATARSAL LEFT;  Surgeon: Oneal Montelongo DPM;  Location: [facility] OR;  Service: Podiatry   ? SKIN CANCER EXCISION     ? TOTAL ANKLE ARTHROPLASTY Left     by Dr. Oneal Montelongo DPM, of [facility]opaedics   ? TRANSURETHRAL RESECTION OF PROSTATE     ? UMBILICAL HERNIA REPAIR      done 7 times; now okay    Family History   Problem Relation Age of Onset   ? Anesthesia problems Sister    ? CABG Sister 65   ? Valvular heart disease Sister    ? Coronary Stenting Sister    ? Pacemaker Sister    ? Diabetes type II Mother    ? Hypertension Mother    ? Morbid Obesity Mother    ? Stroke Mother 56     cerebral hemorrhage at autopsy   ? Aortic aneurysm Father 65     ruptured AAA,  two days after surgery in [place]   ? CABG Brother 70   ? Acute Myocardial Infarction Brother 78      in air evac helicopter between [facility]   ? Coronary Stenting Brother    ? Breast cancer Daughter    ? Lymphoma Son      non Hodgkins ( at [facility])   ? Cancer Brother    ? No Medical Problems Daughter     Social History     Social History   ? Marital status:      Spouse name: Dejan   ? Number of children: 3   ? Years of education: N/A     Occupational History   ? sales of auto parts Retired     Care at [employer] [place] [place]     Social History Main Topics   ? Smoking status: Former Smoker     Packs/day: 0.05     Years: 4.00     Types: Cigarettes     Quit date: 1962   ? Smokeless tobacco: Never Used   ? Alcohol use No   ? Drug use: No   ? Sexual activity: Not on file     Other Topics Concern   ? Not on file     Social History Narrative    Four grandchildren          Medications  Allergies    Current Outpatient Prescriptions   Medication Sig Dispense Refill   ? albuterol (PROVENTIL HFA;VENTOLIN HFA) 90 mcg/actuation inhaler Inhale 2 puffs every 6 (six) hours as needed for wheezing.     ? aspirin 81 mg chewable tablet Chew 81 mg daily.     ? ETANERCEPT (ENBREL SUBQ) Inject 25 mg under the skin once a week.      ? fluticasone-salmeterol (ADVAIR) 500-50 mcg/dose DISKUS Inhale 1 puff 2 (two) times a day as needed.     ? furosemide (LASIX) 20 MG tablet Take 1 tablet (20 mg total) by mouth daily. 90 tablet 3   ? gabapentin (NEURONTIN) 300 MG capsule Take 300 mg by mouth every evening.      ? hydrOXYzine (VISTARIL) 50 MG capsule Take 1 capsule by mouth daily as needed for anxiety.   2   ? metFORMIN (GLUCOPHAGE) 500 MG tablet Take 1,500 mg by mouth daily with breakfast. 1500mg q am and 1000mg q pm     ? metFORMIN (GLUCOPHAGE) 500 MG tablet Take 1,000 mg by mouth every evening. 1500mg q am and 1000mg q pm     ? metoprolol succinate (TOPROL-XL) 25 MG Take 2 tablets (50 mg total) by mouth daily. 135 tablet 3   ? mirtazapine (REMERON) 30 MG tablet Take 30 mg by mouth bedtime.     ? montelukast (SINGULAIR) 10 mg tablet Take 10 mg by mouth bedtime.     ? multivitamin therapeutic (THERAGRAN) tablet Take 1 tablet by mouth daily.     ? nitroglycerin (NITROSTAT) 0.4 MG SL tablet Place 0.4 mg under the tongue as needed.     ? omeprazole (PRILOSEC) 20 MG capsule Take 1 capsule by mouth daily.   2   ? simvastatin (ZOCOR) 10 MG tablet Take 10 mg by mouth at bedtime.   3   ? traMADol (ULTRAM) 50 mg tablet Take 1 tablet (50 mg total) by mouth every 6 (six) hours as needed for pain. 35 tablet 0   ? hydrOXYzine pamoate (VISTARIL) 25 MG capsule Take 2 capsules (50 mg total) by mouth every 6 (six) hours as needed for itching. 35 capsule 0     No current facility-administered medications for this visit.       Allergies   Allergen Reactions   ? Codeine Other (See Comments)     Kidney and liver shuts down and sharp stomach pain      ? Crestor [Rosuvastatin]      Upset stomach   ? Morphine Other (See Comments)     Kidneys and liver shut down   ? Percocet [Oxycodone-Acetaminophen]      Deathly ill/ cramps   ? Vicodin [Hydrocodone-Acetaminophen] Other (See Comments)     Sharp stomach pains   ? Losartan Angiodema         Lab Results    Chemistry CBC BNP   Lab Results   Component Value Date    CREATININE 0.75 05/14/2018    BUN 9 05/14/2018     05/14/2018    K 4.2 05/14/2018     05/14/2018    CO2 23 05/14/2018     Creatinine (mg/dL)   Date Value   05/14/2018 0.75   03/19/2018 0.79   03/08/2018 0.85   10/31/2016 0.73    Lab Results   Component Value Date    WBC 6.2 09/19/2016    HGB 12.7 (L) 03/29/2017    HCT 32.8 (L) 09/19/2016    MCV 79 (L) 09/19/2016     09/19/2016    Lab Results   Component Value Date     (H) 02/14/2018     BNP (pg/mL)   Date Value   02/14/2018 105 (H)   03/24/2017 41   02/06/2017 55          25 minutes were spent with the patient with greater than 50% spent on education and counseling.      Madeleine Zurita, [facility] [place] [facility]   Heart Failure Clinic

## 2021-07-04 NOTE — PROGRESS NOTES
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 was very concerned when this incident came up.   Patient has been a recovering alcoholic for years, but these issues come up about once a year.   Patient has a lot of health issues that he deals with, and sometimes they are hard for him.   She is concerned about what happens from here on. She feels that the patient needs to get his license back his her mother has sight issues and having her drive is concerning.     Attempted to contact Kell 8/20/2019 @ 9:22 AM. Phone answered, and then hung up.               A problematic pattern of alcohol/drug use leading to clinically significant impairment or distress, as manifested by at least two of the following, occurring within a 12-month period:      Specify if: In early remission:  After full criteria for alcohol/drug use disorder were previously met, none of the criteria for alcohol/drug use disorder have been met for at least 3 months but for less than 12 months (with the exception that Criterion A4, Craving or a strong desire or urge to use alcohol/drug may be met).     In sustained remission:   After full criteria for alcohol use disorder were previously met, none of the criteria for alcohol/drug use disorder have been met at any time  during a period of 12 months or longer (with the exception that Criterion A4, Craving or strong desire or urge to use alcohol/drug may be met).   Specify if:   This additional specifier is used if the individual is in an environment where access to alcohol is restricted.    Mild: Presence of 2-3 symptoms  Moderate: Presence of 4-5 symptoms  Severe: Presence of 6 or more symptoms- ALCOHOL; IN SUSTAINED REMISSION    This document is being reviewed and co-signed by a medical doctor. A follow up appointment will be scheduled if necessary to determine medical necessity for treatment services.     Staff Name and Title: RON Boss   Date:  8/16/2019  Time:  10:00 AM

## 2021-07-14 PROBLEM — I25.10 CORONARY ARTERY CALCIFICATION SEEN ON CT SCAN: Chronic | Status: RESOLVED | Noted: 2017-04-06 | Resolved: 2017-09-01

## 2021-07-14 PROBLEM — R00.1 BRADYCARDIA: Status: RESOLVED | Noted: 2018-05-02 | Resolved: 2018-05-21

## 2021-08-29 ENCOUNTER — HEALTH MAINTENANCE LETTER (OUTPATIENT)
Age: 81
End: 2021-08-29

## 2021-10-24 ENCOUNTER — HEALTH MAINTENANCE LETTER (OUTPATIENT)
Age: 81
End: 2021-10-24

## 2022-10-15 ENCOUNTER — HEALTH MAINTENANCE LETTER (OUTPATIENT)
Age: 82
End: 2022-10-15

## 2023-10-29 ENCOUNTER — HEALTH MAINTENANCE LETTER (OUTPATIENT)
Age: 83
End: 2023-10-29